# Patient Record
Sex: MALE | Race: WHITE | Employment: STUDENT | URBAN - METROPOLITAN AREA
[De-identification: names, ages, dates, MRNs, and addresses within clinical notes are randomized per-mention and may not be internally consistent; named-entity substitution may affect disease eponyms.]

---

## 2021-04-09 ENCOUNTER — NURSE TRIAGE (OUTPATIENT)
Dept: NURSING | Facility: CLINIC | Age: 21
End: 2021-04-09

## 2021-04-09 ENCOUNTER — OFFICE VISIT (OUTPATIENT)
Dept: FAMILY MEDICINE | Facility: CLINIC | Age: 21
End: 2021-04-09
Payer: COMMERCIAL

## 2021-04-09 ENCOUNTER — ANCILLARY PROCEDURE (OUTPATIENT)
Dept: GENERAL RADIOLOGY | Facility: CLINIC | Age: 21
End: 2021-04-09
Attending: FAMILY MEDICINE
Payer: COMMERCIAL

## 2021-04-09 VITALS
SYSTOLIC BLOOD PRESSURE: 130 MMHG | HEART RATE: 102 BPM | BODY MASS INDEX: 35 KG/M2 | RESPIRATION RATE: 18 BRPM | WEIGHT: 250 LBS | HEIGHT: 71 IN | TEMPERATURE: 98.9 F | OXYGEN SATURATION: 97 % | DIASTOLIC BLOOD PRESSURE: 74 MMHG

## 2021-04-09 DIAGNOSIS — F41.1 GAD (GENERALIZED ANXIETY DISORDER): ICD-10-CM

## 2021-04-09 DIAGNOSIS — R07.89 SENSATION OF CHEST TIGHTNESS: Primary | ICD-10-CM

## 2021-04-09 DIAGNOSIS — F33.2 SEVERE EPISODE OF RECURRENT MAJOR DEPRESSIVE DISORDER, WITHOUT PSYCHOTIC FEATURES (H): ICD-10-CM

## 2021-04-09 PROCEDURE — 99214 OFFICE O/P EST MOD 30 MIN: CPT | Performed by: FAMILY MEDICINE

## 2021-04-09 PROCEDURE — 71046 X-RAY EXAM CHEST 2 VIEWS: CPT | Performed by: RADIOLOGY

## 2021-04-09 PROCEDURE — 96127 BRIEF EMOTIONAL/BEHAV ASSMT: CPT | Mod: 59 | Performed by: FAMILY MEDICINE

## 2021-04-09 PROCEDURE — 93000 ELECTROCARDIOGRAM COMPLETE: CPT | Performed by: FAMILY MEDICINE

## 2021-04-09 RX ORDER — HYDROXYZINE HYDROCHLORIDE 25 MG/1
25-50 TABLET, FILM COATED ORAL 3 TIMES DAILY PRN
Qty: 30 TABLET | Refills: 0 | Status: SHIPPED | OUTPATIENT
Start: 2021-04-09 | End: 2021-05-12

## 2021-04-09 RX ORDER — BUTALBITAL, ACETAMINOPHEN AND CAFFEINE 50; 325; 40 MG/1; MG/1; MG/1
1 CAPSULE ORAL EVERY 4 HOURS PRN
COMMUNITY
End: 2022-04-28

## 2021-04-09 RX ORDER — FLUOXETINE 10 MG/1
10 CAPSULE ORAL DAILY
Qty: 14 CAPSULE | Refills: 0 | Status: SHIPPED | OUTPATIENT
Start: 2021-04-09 | End: 2021-07-03

## 2021-04-09 RX ORDER — OMEGA-3 FATTY ACIDS/FISH OIL 300-1000MG
200 CAPSULE ORAL EVERY 4 HOURS PRN
COMMUNITY
End: 2022-04-28

## 2021-04-09 ASSESSMENT — ANXIETY QUESTIONNAIRES
6. BECOMING EASILY ANNOYED OR IRRITABLE: NEARLY EVERY DAY
GAD7 TOTAL SCORE: 21
7. FEELING AFRAID AS IF SOMETHING AWFUL MIGHT HAPPEN: NEARLY EVERY DAY
3. WORRYING TOO MUCH ABOUT DIFFERENT THINGS: NEARLY EVERY DAY
1. FEELING NERVOUS, ANXIOUS, OR ON EDGE: NEARLY EVERY DAY
5. BEING SO RESTLESS THAT IT IS HARD TO SIT STILL: NEARLY EVERY DAY
2. NOT BEING ABLE TO STOP OR CONTROL WORRYING: NEARLY EVERY DAY
IF YOU CHECKED OFF ANY PROBLEMS ON THIS QUESTIONNAIRE, HOW DIFFICULT HAVE THESE PROBLEMS MADE IT FOR YOU TO DO YOUR WORK, TAKE CARE OF THINGS AT HOME, OR GET ALONG WITH OTHER PEOPLE: EXTREMELY DIFFICULT

## 2021-04-09 ASSESSMENT — PATIENT HEALTH QUESTIONNAIRE - PHQ9
5. POOR APPETITE OR OVEREATING: NEARLY EVERY DAY
SUM OF ALL RESPONSES TO PHQ QUESTIONS 1-9: 21

## 2021-04-09 ASSESSMENT — MIFFLIN-ST. JEOR: SCORE: 2177.47

## 2021-04-09 NOTE — TELEPHONE ENCOUNTER
"Caller reporting that he has same chest discomfort that he was seen for in clinic earlier. Sates that he took one hydroxyzine and isn't better statees he feels he is having panic attack again.   Triage protocol and EMR from [facility] reviewed.   Mother states that she wants  doctor to order  \"blood tests like liver function\" and is  calling assuming clinic still open and PCP still present; Advised clinic closed at 5 pm  Advised to continue medication as prescribed and  call PCP if not improving by Monday   advised to try relaxation techniques an  Home care per protocol   Mother and patient understands and will coply   Kelsy Robins RN  FNA                 Additional Information    Negative: Severe difficulty breathing (e.g., struggling for each breath, speaks in single words)    Negative: Bluish (or gray) lips or face now    Negative: Difficult to awaken or acting confused (e.g., disoriented, slurred speech)    Negative: Hysterical or combative behavior    Negative: Sounds like a life-threatening emergency to the triager    Chest pain    Negative: Severe difficulty breathing (e.g., struggling for each breath, speaks in single words)    Negative: Difficult to awaken or acting confused (e.g., disoriented, slurred speech)    Negative: Shock suspected (e.g., cold/pale/clammy skin, too weak to stand, low BP, rapid pulse)    Negative: [1] Chest pain lasts > 5 minutes AND [2] history of heart disease  (i.e., heart attack, bypass surgery, angina, angioplasty, CHF; not just a heart murmur)    Negative: [1] Chest pain lasts > 5 minutes AND [2] described as crushing, pressure-like, or heavy    Negative: [1] Chest pain lasts > 5 minutes AND [2] age > 50    Negative: [1] Chest pain lasts > 5 minutes AND [2] age > 30 AND [3] at least one cardiac risk factor (i.e., hypertension, diabetes, obesity, smoker or strong family history of heart disease)    Negative: [1] Chest pain lasts > 5 minutes AND [2] not relieved with nitroglycerin    " "Negative: Passed out (i.e., lost consciousness, collapsed and was not responding)    Negative: Heart beating < 50 beats per minute OR > 140 beats per minute    Negative: Visible sweat on face or sweat dripping down face    Negative: Sounds like a life-threatening emergency to the triager    Negative: Followed a chest injury    Negative: SEVERE chest pain    Negative: [1] Intermittent  chest pain or \"angina\" AND [2] increasing in severity or frequency  (Exception: pains lasting a few seconds)    Negative: Pain also present in shoulder(s) or arm(s) or jaw  (Exception: pain is clearly made worse by movement)    Negative: Difficulty breathing    Negative: Dizziness or lightheadedness    Negative: Coughing up blood    Negative: Cocaine use within last 3 days    Negative: History of prior \"blood clot\" in leg or lungs (i.e., deep vein thrombosis, pulmonary embolism)    Negative: Recent illness requiring prolonged bedrest (i.e., immobilization)    Negative: Hip or leg fracture in past 2 months (e.g., had cast on leg or ankle)    Negative: Major surgery in the past month    Negative: Recent long-distance travel with prolonged time in car, bus, plane, or train (i.e., within past 2 weeks; 6 or  more hours duration)    Negative: Chest pain lasts > 5 minutes (Exceptions: chest pain occurring > 3 days ago and now asymptomatic; same as previously diagnosed heartburn and has accompanying sour taste in mouth)    Negative: Taking a deep breath makes pain worse    Negative: Patient sounds very sick or weak to the triager    Negative: [1] Chest pain lasts > 5 minutes AND [2] occurred > 3 days ago (72 hours) AND [3] NO chest pain or cardiac symptoms now    Negative: [1] Chest pain lasting <= 5 minutes AND [2] NO chest pain or cardiac symptoms now(Exceptions: pains lasting a few seconds)    Negative: Fever > 100.5 F (38.1 C)    Negative: Rash in same area as pain (may be described as \"small blisters\")    Negative: [1] Patient claims " "chest pain is same as previously diagnosed \"heartburn\" AND [2] describes burning in chest AND [3] accompanying sour taste in mouth    Negative: [1] Chest pain lasting <= 5 minutes AND [2] has not taken prescribed nitroglycerin    Negative: [1] Chest pain lasting <= 5 minutes AND [2] completely relieved by nitroglycerin    Negative: [1] Intermittent chest pain from \"angina\" AND [2] NO increase in severity or frequency    Negative: Chest pain(s) lasting a few seconds from coughing AND [2] persists > 3 days    Negative: [1] Chest pain(s) lasting a few seconds AND [2] persists > 3 days    Negative: Chest pain(s) lasting a few seconds from coughing    Negative: Chest pain(s) lasting a few seconds    Negative: [1] Difficulty breathing AND [2] persists > 10 minutes AND [3] not relieved by reassurance provided by triager    Negative: [1] Lightheadedness or dizziness AND [2] persists > 10 minutes AND [3] not relieved by reassurance provided by triager    Negative: [1] Alcohol or drug abuse, known or suspected AND [2] feeling very shaky (i.e., visible tremors of hands)    Negative: Patient sounds very sick or weak to the triager    Negative: Symptoms interfere with work or school    Negative: Requesting to talk to a counselor (e.g., mental health worker, psychiatrist)    Negative: Patient sounds very upset or troubled to the triager    [1] Started on anti-anxiety medication AND [2] no relief    Commented on: All Negative - Home Care     Same  sternalpain seen today in clinicfor  And after work up  Dx anxiety    Protocols used: ANXIETY AND PANIC ATTACK-A-AH, CHEST PAIN-A-AH      "

## 2021-04-09 NOTE — PROGRESS NOTES
1. Sensation of chest tightness  - d/d ACS vs PE vs pneumonia vs aortic dissection vs panic attack vs costochondritis vs GERD vs other etiology  - EKG was ordered which showed no acute changes  - CXR per my read showed no acute changes  - Discussed symptoms could be due to panic attacks and recommended deep breathing.   - EKG 12-lead complete w/read - Clinics  - XR Chest 2 Views    2. Severe episode of recurrent major depressive disorder, without psychotic features (H)  PHQ 4/9/2021   PHQ-9 Total Score 21   Q9: Thoughts of better off dead/self-harm past 2 weeks More than half the days     - Severe depression and with anxiety and panic attacks. Passive SI, no active thoughts. Mom is supportive and if thoughts were to get worse then pt would be seen in ER.   - Discussed different modalities to treat depression including exercise, journaling, partaking in interest, therapy and antidepressants. Explained different options for antidepressants including most common side effects.  - Discussed that Prozac usually takes few weeks for clinical effect. Discussed side effects of medication.   - Recommended to use Atarax PRN to help with anxiety and panic attacks. Advised medication causes drowsiness and to not drive or take with alcohol.   - FLUoxetine (PROZAC) 10 MG capsule; Take 1 capsule (10 mg) by mouth daily for 14 days  Dispense: 14 capsule; Refill: 0  - FLUoxetine (PROZAC) 20 MG capsule; Take 1 capsule (20 mg) by mouth daily  Dispense: 30 capsule; Refill: 0  - hydrOXYzine (ATARAX) 25 MG tablet; Take 1-2 tablets (25-50 mg) by mouth 3 times daily as needed for anxiety  Dispense: 30 tablet; Refill: 0  - MENTAL HEALTH REFERRAL  - Adult; Outpatient Treatment; Individual/Couples/Family/Group Therapy/Health Psychology; [facility]: [facility] [phone]; We will contact you to schedule the appointment or please call with any questions    3. MAXIMUS (generalized anxiety disorder)  - see above  - FLUoxetine (PROZAC) 10  MG capsule; Take 1 capsule (10 mg) by mouth daily for 14 days  Dispense: 14 capsule; Refill: 0  - FLUoxetine (PROZAC) 20 MG capsule; Take 1 capsule (20 mg) by mouth daily  Dispense: 30 capsule; Refill: 0  - hydrOXYzine (ATARAX) 25 MG tablet; Take 1-2 tablets (25-50 mg) by mouth 3 times daily as needed for anxiety  Dispense: 30 tablet; Refill: 0  - MENTAL HEALTH REFERRAL  - Adult; Outpatient Treatment; Individual/Couples/Family/Group Therapy/Health Psychology; [place]: [facility] [phone]; We will contact you to schedule the appointment or please call with any questions    Subjective   Vidal is a 20 year old who presents for the following health issues     HPI     The last two night he has been experiencing panic attacks. He was shaking, hyperventilating and crying. They called 911 and yesterday he was evaluated by EMS. Normal vitals. When he breaths now he feels tension in his chest. H/o MDD and MAXIMUS - he notes that symptoms have gotten worse over the last few weeks. Stressors - drama with some friends. Recent move and it is a completely new environment.  He is worried about getting a job. During the panic attacks he is thinking about good things and tries to distract himself. That usually works. When he tried to sleep today, it was difficult due to tension in chest. Last night sleep was disrupted due to panic attacks. He has thoughts of being better off dead, he has symptoms of worthlessness. Couple of weeks ago he was really down. He started thinking of a way out. He decided that he wasn't going to do it. He told his Mom. No weapons at home. No previous suicide attempts. No previous medications. He would be interested in therapy. No delusions or hallucinations. No bipolar symptoms. H/o ADHD - no medications. He graduated one year ago. No cough.     Review of Systems         Objective    There were no vitals taken for this visit.  There is no height or weight on file to calculate BMI.  Physical  "Exam   /74 (BP Location: Right arm, Patient Position: Chair, Cuff Size: Adult Large)   Pulse 102   Temp 98.9  F (37.2  C) (Tympanic)   Resp 18   Ht 1.814 m (5' 11.4\")   Wt 113.4 kg (250 lb)   SpO2 97%   BMI 34.48 kg/m    GENERAL: healthy, alert and no distress  RESP: lungs clear to auscultation - no rales, rhonchi or wheezes  CV: regular rate and rhythm, normal S1 S2  PSYCH: mood depressed, affect congruent                  "

## 2021-04-09 NOTE — PATIENT INSTRUCTIONS
- FLUoxetine (PROZAC) 10 MG capsule; Take 1 capsule (10 mg) by mouth daily for 14 days   - FLUoxetine (PROZAC) 20 MG capsule; Take 1 capsule (20 mg) by mouth daily    - hydrOXYzine (ATARAX) 25 MG tablet; Take 1-2 tablets (25-50 mg) by mouth 3 times daily as needed for anxiety ; do not drive or take with alcohol

## 2021-04-09 NOTE — LETTER
April 13, 2021      Vidal Allardice Fuad  [address] [place] [facility] [address]        Dear ,    We are writing to inform you of your test results.      Your chest xray was normal. Please call or message me if you have questions or concerns.     Resulted Orders   XR Chest 2 Views    Narrative    CHEST TWO VIEWS 4/9/2021 2:47 PM     HISTORY: Sensation of chest tightness.    COMPARISON: None.       Impression    IMPRESSION:  There are no acute infiltrates. The cardiac silhouette is  not enlarged. Pulmonary vasculature is unremarkable.     PAVAN MICHAEL MD     Sincerely,      Luis F Arce MD

## 2021-04-10 ASSESSMENT — ANXIETY QUESTIONNAIRES: GAD7 TOTAL SCORE: 21

## 2021-04-13 NOTE — RESULT ENCOUNTER NOTE
Please send the letter to the patient with the following.         Your chest xray was normal. Please call or message me if you have questions or concerns.

## 2021-05-06 ENCOUNTER — OFFICE VISIT (OUTPATIENT)
Dept: URGENT CARE | Facility: URGENT CARE | Age: 21
End: 2021-05-06
Payer: COMMERCIAL

## 2021-05-06 ENCOUNTER — NURSE TRIAGE (OUTPATIENT)
Dept: NURSING | Facility: CLINIC | Age: 21
End: 2021-05-06

## 2021-05-06 VITALS
HEART RATE: 97 BPM | DIASTOLIC BLOOD PRESSURE: 80 MMHG | WEIGHT: 250 LBS | SYSTOLIC BLOOD PRESSURE: 132 MMHG | OXYGEN SATURATION: 97 % | TEMPERATURE: 98.1 F | BODY MASS INDEX: 34.48 KG/M2

## 2021-05-06 DIAGNOSIS — H93.13 TINNITUS, BILATERAL: Primary | ICD-10-CM

## 2021-05-06 PROCEDURE — 99213 OFFICE O/P EST LOW 20 MIN: CPT | Performed by: FAMILY MEDICINE

## 2021-05-06 NOTE — PATIENT INSTRUCTIONS
Discontinue use of aspirin      For pain use the tylenol and/or ibuprofen instead of aspirin      Continue the fluoxetine at this time  -- if no improvement in symptoms please reach out to your doctor's office to discuss a plan

## 2021-05-06 NOTE — TELEPHONE ENCOUNTER
Spoke w/ mother after obtaining verbal consent from pt.     C/o tinnitus in both ears. L ear worse than R. Denies ear injury or loud noise. Does stick Q-tips in his ears. Denies hearing loss or ear pain. Took ASA on 5/1 x 1 dose but otherwise no ASA use. Takes fluoxetine and hydroxizine. Says tinnitus interfering w/ his usual activities. Advised provider eval w/i 3 days.     Additional Information    Negative: Hearing loss is main symptom    Negative: Followed an ear injury    Negative: Patient sounds very sick or weak to the triager    Negative: [1] Hearing loss in one or both ears AND [2] sudden onset AND [3] present now    Negative: Ear is painful    Negative: Decreased hearing followed sudden, extremely loud noise (e.g., explosion, not just loud concert)    Negative: Taking medication that can damage hearing (i.e., gentamycin, tobramycin, furosemide, ethacrynic acid, cisplatin, quinidine)    Negative: Long-term (current) use of aspirin    MODERATE-SEVERE tinnitus (i.e., interferes with work, school, or sleep)    Protocols used: TINNITUS-A-AH

## 2021-05-06 NOTE — PROGRESS NOTES
Assessment & Plan     Tinnitus, bilateral       Patient did take aspirin for some rib discomfort the other day there is a small but possible chance this may have triggered things. Incidence of tinnitus with prozac use is ~1% -- considered risks/benefits. Discussed that tinnitus is not uncommon and in most individuals resolves within 1-2 weeks. Should symptoms worsen or persist recommend referral to ENT specialist.     No evidence of infection on exam    Emiliano Oneal MD   [place] [facility]    Rip Drake is a 20 year old male who presents to clinic today for the following health issues:  Chief Complaint   Patient presents with     Urgent Care     Ear Problem     c/o ringing on ears for 1 day     HPI    He denies ear pain. The ear ringing first started 2 days ago -- this appears to be constant.   He denies dizziness/fevers.   Has had new medications in the last month started on fluoxetine along with PRN hydroxyzine.     Denies hearing loss  No recent travel  Or diving.     No runny nose/congestion.   They are in the process of getting him in to see therapy.   There has been a noticeable improvement in his panic attacks while being on fluoxetine (little benefit with the atarax)     Remedies attempted: none    Accompanied by mom  Patient Active Problem List    Diagnosis Date Noted     Autism spectrum disorder 04/05/2016     Priority: Medium     Anxiety 10/29/2012     Priority: Medium     Wart 04/04/2012     Priority: Medium     Pervasive developmental disorder 10/05/2011     Priority: Medium     Cellulitis 10/05/2011     Priority: Medium     ADHD (attention deficit hyperactivity disorder) 10/15/2010     Priority: Medium     FAMILY HX CONDITION NEC cystic fibrosis in half br 03/20/2007     Priority: Medium     Father and step mother carriers of cystic fibrosis       Constipation 03/20/2007     Priority: Medium     Problem list name updated by automated process. Provider to review       eczema  04/07/2004     Priority: Medium       Current Outpatient Medications   Medication     FLUoxetine (PROZAC) 20 MG capsule     hydrOXYzine (ATARAX) 25 MG tablet     butalbital-acetaminophen-caffeine (ESGIC) -40 MG CAPS per capsule     FLUoxetine (PROZAC) 10 MG capsule     ibuprofen (ADVIL/MOTRIN) 200 MG capsule     No current facility-administered medications for this visit.            Objective    /80   Pulse 97   Temp 98.1  F (36.7  C) (Tympanic)   Wt 113.4 kg (250 lb)   SpO2 97%   BMI 34.48 kg/m    Physical Exam     Ears: normal TMs bilaterally    No results found for any visits on 05/06/21.                  The use of Dragon/[employer] dictation services may have been used to construct the content in this note; any grammatical or spelling errors are non-intentional. Please contact the author of this note directly if you are in need of any clarification.

## 2021-05-12 ENCOUNTER — OFFICE VISIT (OUTPATIENT)
Dept: FAMILY MEDICINE | Facility: CLINIC | Age: 21
End: 2021-05-12
Payer: COMMERCIAL

## 2021-05-12 VITALS
DIASTOLIC BLOOD PRESSURE: 79 MMHG | TEMPERATURE: 97.8 F | WEIGHT: 250 LBS | HEART RATE: 88 BPM | OXYGEN SATURATION: 94 % | BODY MASS INDEX: 34.48 KG/M2 | SYSTOLIC BLOOD PRESSURE: 144 MMHG

## 2021-05-12 DIAGNOSIS — F33.2 SEVERE EPISODE OF RECURRENT MAJOR DEPRESSIVE DISORDER, WITHOUT PSYCHOTIC FEATURES (H): ICD-10-CM

## 2021-05-12 DIAGNOSIS — F41.1 GAD (GENERALIZED ANXIETY DISORDER): ICD-10-CM

## 2021-05-12 PROCEDURE — 99214 OFFICE O/P EST MOD 30 MIN: CPT | Performed by: STUDENT IN AN ORGANIZED HEALTH CARE EDUCATION/TRAINING PROGRAM

## 2021-05-12 RX ORDER — HYDROXYZINE HYDROCHLORIDE 25 MG/1
25-50 TABLET, FILM COATED ORAL 3 TIMES DAILY PRN
Qty: 60 TABLET | Refills: 1 | Status: SHIPPED | OUTPATIENT
Start: 2021-05-12 | End: 2022-04-28

## 2021-05-12 ASSESSMENT — ANXIETY QUESTIONNAIRES
3. WORRYING TOO MUCH ABOUT DIFFERENT THINGS: NEARLY EVERY DAY
1. FEELING NERVOUS, ANXIOUS, OR ON EDGE: MORE THAN HALF THE DAYS
GAD7 TOTAL SCORE: 14
2. NOT BEING ABLE TO STOP OR CONTROL WORRYING: NEARLY EVERY DAY
GAD7 TOTAL SCORE: 14
5. BEING SO RESTLESS THAT IT IS HARD TO SIT STILL: SEVERAL DAYS
7. FEELING AFRAID AS IF SOMETHING AWFUL MIGHT HAPPEN: MORE THAN HALF THE DAYS
7. FEELING AFRAID AS IF SOMETHING AWFUL MIGHT HAPPEN: MORE THAN HALF THE DAYS
4. TROUBLE RELAXING: MORE THAN HALF THE DAYS
GAD7 TOTAL SCORE: 14
6. BECOMING EASILY ANNOYED OR IRRITABLE: SEVERAL DAYS

## 2021-05-12 ASSESSMENT — PATIENT HEALTH QUESTIONNAIRE - PHQ9
10. IF YOU CHECKED OFF ANY PROBLEMS, HOW DIFFICULT HAVE THESE PROBLEMS MADE IT FOR YOU TO DO YOUR WORK, TAKE CARE OF THINGS AT HOME, OR GET ALONG WITH OTHER PEOPLE: SOMEWHAT DIFFICULT
SUM OF ALL RESPONSES TO PHQ QUESTIONS 1-9: 17
SUM OF ALL RESPONSES TO PHQ QUESTIONS 1-9: 17

## 2021-05-12 NOTE — LETTER
My Depression Action Plan  Name: Vidal Merida   Date of Birth 2000  Date: 5/12/2021    My doctor: Herman Bustos   My clinic: [facility] [address] [place] [address]  [phone]          GREEN    ZONE   Good Control    What it looks like:     Things are going generally well. You have normal ups and downs. You may even feel depressed from time to time, but bad moods usually last less than a day.   What you need to do:  1. Continue to care for yourself (see self care plan)  2. Check your depression survival kit and update it as needed  3. Follow your physician s recommendations including any medication.  4. Do not stop taking medication unless you consult with your physician first.           YELLOW         ZONE Getting Worse    What it looks like:     Depression is starting to interfere with your life.     It may be hard to get out of bed; you may be starting to isolate yourself from others.    Symptoms of depression are starting to last most all day and this has happened for several days.     You may have suicidal thoughts but they are not constant.   What you need to do:     1. Call your care team. Your response to treatment will improve if you keep your care team informed of your progress. Yellow periods are signs an adjustment may need to be made.     2. Continue your self-care.  Just get dressed and ready for the day.  Don't give yourself time to talk yourself out of it.    3. Talk to someone in your support network.    4. Open up your Depression Self-Care Plan/Wellness Kit.           RED    ZONE Medical Alert - Get Help    What it looks like:     Depression is seriously interfering with your life.     You may experience these or other symptoms: You can t get out of bed most days, can t work or engage in other necessary activities, you have trouble taking care of basic hygiene, or basic responsibilities, thoughts of suicide or death that  will not go away, self-injurious behavior.     What you need to do:  1. Call your care team and request a same-day appointment. If they are not available (weekends or after hours) call your local crisis line, emergency room or 911.          Depression Self-Care Plan / Wellness Kit    Many people find that medication and therapy are helpful treatments for managing depression. In addition, making small changes to your everyday life can help to boost your mood and improve your wellbeing. Below are some tips for you to consider. Be sure to talk with your medical provider and/or behavioral health consultant if your symptoms are worsening or not improving.     Sleep   Sleep hygiene  means all of the habits that support good, restful sleep. It includes maintaining a consistent bedtime and wake time, using your bedroom only for sleeping or sex, and keeping the bedroom dark and free of distractions like a computer, smartphone, or television.     Develop a Healthy Routine  Maintain good hygiene. Get out of bed in the morning, make your bed, brush your teeth, take a shower, and get dressed. Don t spend too much time viewing media that makes you feel stressed. Find time to relax each day.    Exercise  Get some form of exercise every day. This will help reduce pain and release endorphins, the  feel good  chemicals in your brain. It can be as simple as just going for a walk or doing some gardening, anything that will get you moving.      Diet  Strive to eat healthy foods, including fruits and vegetables. Drink plenty of water. Avoid excessive sugar, caffeine, alcohol, and other mood-altering substances.     Stay Connected with Others  Stay in touch with friends and family members.    Manage Your Mood  Try deep breathing, massage therapy, biofeedback, or meditation. Take part in fun activities when you can. Try to find something to smile about each day.     Psychotherapy  Be open to working with a therapist if your provider  recommends it.     Medication  Be sure to take your medication as prescribed. Most anti-depressants need to be taken every day. It usually takes several weeks for medications to work. Not all medicines work for all people. It is important to follow-up with your provider to make sure you have a treatment plan that is working for you. Do not stop your medication abruptly without first discussing it with your provider.    Crisis Resources   These hotlines are for both adults and children. They and are open 24 hours a day, 7 days a week unless noted otherwise.      National Suicide Prevention Lifeline   0-618-883-ZBAX (3160)      Crisis Text Line    www.crisistextline.org  Text HOME to 547581 from anywhere in the [place], anytime, about any type of crisis. A live, trained crisis counselor will receive the text and respond quickly.      [employer] Lifeline for LGBTQ Youth  A national crisis intervention and suicide lifeline for LGBTQ youth under 25. Provides a safe place to talk without judgement. Call 1-747.150.7617; text START to 036191 or visit www.thetrevorproject.org to talk to a trained counselor.      For [facility] crisis numbers, visit the [facility] website at:  https://[place].gov/dhs/people-we-serve/adults/health-care/mental-health/resources/crisis-contacts.jsp

## 2021-05-12 NOTE — PROGRESS NOTES
Assessment & Plan     Severe episode of recurrent major depressive disorder, without psychotic features (H)  Side effect of tinnitus with Prozac is about 1%. I am not convinced this was a consequence of taking the medication. He does have history of seasonal allergies and we discussed this may have been a possible trigger. Tinnitus has nearly resolved per patient. Discussed options to restart Prozac and see whether symptoms recur or try an alternative selective serotonin reuptake inhibitor. He would like to do the former and will call if he has recurrence of symptoms. Otherwise follow up in 1 month for recheck. He DOES contract for safety today based on lack of suicidal thoughts or plan and reported improvement in symptoms, ability to clearly state a safety plan. Depression action plan provided today. He will call therapy referral line.   - hydrOXYzine (ATARAX) 25 MG tablet  Dispense: 60 tablet; Refill: 1  - FLUoxetine (PROZAC) 20 MG capsule  Dispense: 30 capsule; Refill: 0    MAXIMUS (generalized anxiety disorder)  - hydrOXYzine (ATARAX) 25 MG tablet  Dispense: 60 tablet; Refill: 1  - FLUoxetine (PROZAC) 20 MG capsule  Dispense: 30 capsule; Refill: 0    Depression Screening Follow Up    PHQ 5/12/2021   PHQ-9 Total Score 17   Q9: Thoughts of better off dead/self-harm past 2 weeks More than half the days   F/U: Thoughts of suicide or self-harm Yes   F/U: Self harm-plan No   F/U: Self-harm action No   F/U: Safety concerns No       Natalie Hartley MD  [facility]    Rip Drake is a 20 year old who presents for the following health issues  accompanied by his mother:    HPI     Seen 4/9 for panic attacks and worsened anxiety. History of MAXIMUS, MDD. Has had suicidal thoughts in the past but sought help and has not had any previous attempt. He was started on Prozac and Atarax to take as needed. Also referred to therapy.     Updates:  Things had seemed to be going better on Prozac. He had  started on the 20 mg dose, however just around this time he developed tinnitus. Seen for this in  on 5/6. He stopped the Prozac at this time concerned that it was a side effect. Has since noted that the tinnitus has improved mostly. Hasn't had any panic attacks since his visit in early April and would like to get back on medication.  He is not seeing a therapist yet. Does have phone contact info to call. He is not currently working. Living with mom. Enjoys playing video games. Not doing a lot otherwise. He has been feeling less sad since starting the medication. Sleep has been OK. No longer having issues with friends (had noted this as a pervious stressor). He is currently trying to get his license, trying to get a job. Tends to over think things. Denies loss of motivation. Hasn't had any issues with focus. Had been perseverating about the tinnitus but otherwise no other obsessions. He denies any thoughts of self harm or SI. Had been having passing suicidal thoughts at his last visit but these have resolved and not returned. Has never made an attempt at self harm or suicide in past. States he would seek out help by calling a crisis line or talking to his mom if those thoughts were to come up. He was taking hydroxyzine 1-2 tabs as needed until he ran out last week.     Issues with depression and anxiety started since COVID pandemic and moving to [place] in October after his partents' divorce. Grandfather started chemotherapy. Some big stressors.     Did have a couple headaches with left ear pain.  No sinus or facial pressure. Has had issues with seasonal allergies as a child but they got better with age. No hearing loss, ear fullness, ear pain, drainage, vertigo.       MAXIMUS-7 SCORE 4/9/2021 5/12/2021   Total Score - 14 (moderate anxiety)   Total Score 21 14       PHQ 4/9/2021 5/12/2021   PHQ-9 Total Score 21 17   Q9: Thoughts of better off dead/self-harm past 2 weeks More than half the days More than half the days    F/U: Thoughts of suicide or self-harm - Yes   F/U: Self harm-plan - No   F/U: Self-harm action - No   F/U: Safety concerns - No       Social History     Tobacco Use     Smoking status: Never Smoker     Smokeless tobacco: Never Used     Tobacco comment: nonsmoking household   Substance Use Topics     Alcohol use: No     Drug use: No     MAXIMUS-7 SCORE 4/9/2021 5/12/2021   Total Score - 14 (moderate anxiety)   Total Score 21 14     PHQ 4/9/2021 5/12/2021   PHQ-9 Total Score 21 17   Q9: Thoughts of better off dead/self-harm past 2 weeks More than half the days More than half the days   F/U: Thoughts of suicide or self-harm - Yes   F/U: Self harm-plan - No   F/U: Self-harm action - No   F/U: Safety concerns - No         Objective    BP (!) 144/79   Pulse 88   Temp 97.8  F (36.6  C) (Tympanic)   Wt 113.4 kg (250 lb)   SpO2 94%   BMI 34.48 kg/m    Body mass index is 34.48 kg/m .  Physical Exam   GENERAL: healthy, alert and no distress  EYES: Eyes grossly normal to inspection, PERRL and conjunctivae and sclerae normal  HENT: ear canals and TM's normal, nose and mouth without ulcers or lesions  LUNGS: respirations unlabored  MS: no gross musculoskeletal defects noted, no edema  PSYCH: euthymic, normal affect, no SI/HI    Answers for HPI/ROS submitted by the patient on 5/12/2021   If you checked off any problems, how difficult have these problems made it for you to do your work, take care of things at home, or get along with other people?: Somewhat difficult  PHQ9 TOTAL SCORE: 17  MAXIMUS 7 TOTAL SCORE: 14

## 2021-05-12 NOTE — PATIENT INSTRUCTIONS
Restart prozac.    If ringing in ears returns, please call us and schedule and appointment.     Follow up for recheck of mood in 1 month with myself or Dr. Arce.    Call to schedule your visit with therapy.

## 2021-05-13 ASSESSMENT — PATIENT HEALTH QUESTIONNAIRE - PHQ9: SUM OF ALL RESPONSES TO PHQ QUESTIONS 1-9: 17

## 2021-05-13 ASSESSMENT — ANXIETY QUESTIONNAIRES: GAD7 TOTAL SCORE: 14

## 2021-05-30 ENCOUNTER — OFFICE VISIT (OUTPATIENT)
Dept: URGENT CARE | Facility: URGENT CARE | Age: 21
End: 2021-05-30
Payer: COMMERCIAL

## 2021-05-30 VITALS
BODY MASS INDEX: 33.86 KG/M2 | TEMPERATURE: 98 F | RESPIRATION RATE: 16 BRPM | HEART RATE: 80 BPM | HEIGHT: 72 IN | SYSTOLIC BLOOD PRESSURE: 120 MMHG | DIASTOLIC BLOOD PRESSURE: 82 MMHG | WEIGHT: 250 LBS

## 2021-05-30 DIAGNOSIS — H93.13 TINNITUS, BILATERAL: Primary | ICD-10-CM

## 2021-05-30 PROCEDURE — 99213 OFFICE O/P EST LOW 20 MIN: CPT | Performed by: FAMILY MEDICINE

## 2021-05-30 ASSESSMENT — MIFFLIN-ST. JEOR: SCORE: 2179.05

## 2021-05-30 NOTE — PROGRESS NOTES
"SUBJECTIVE:   Vidal Merida is a 20 year old male presenting with   Chief Complaint   Patient presents with     Urgent Care     Tinnitus     ringing in both ears x 3 weeks, worse in  left ear. Also some headaches.      He first noticed ringing in his ears about 3 weeks ago, described as a high pitched sound that waxes and wanes.  He was seen in  on 5/6, note reviewed.   He did take 1 aspirin, but thinks that may have been after the ringing started, and also recalls increasing his prozac from 10mg to 20mg a few days prior to onset of the ringing starting.  He quit taking the prozac when the ringing started, in case this was causing his symptoms.  He does use earbuds and headphones.   He is here today with his Mother as the ringing has not subsided.  He uses white noise at night to help distract.   He has had some post nasal drainage, but not really feeling stuffy or congested.  No ear pain.     OBJECTIVE  /82   Pulse 80   Temp 98  F (36.7  C) (Tympanic)   Resp 16   Ht 1.816 m (5' 11.5\")   Wt 113.4 kg (250 lb)   BMI 34.38 kg/m    GENERAL:  Awake, alert and interactive. No acute distress.  HEENT:   NC/AT, EOMI, clear conjunctiva.  Clear nasal discharge.  TM's and EAC's benign.      ASSESSMENT/PLAN    ICD-10-CM    1. Tinnitus, bilateral  H93.13 OTOLARYNGOLOGY REFERRAL     Advised no headphones or earbuds at all until he has his hearing evaluation.   Keep volumes down on tv and electronics.  Continue with white noise as needed at night time.   F/u with ENT, referral placed today, and f/u with PCP if any worsening symptoms prior to ENT appointment.                          "

## 2021-07-03 ENCOUNTER — OFFICE VISIT (OUTPATIENT)
Dept: URGENT CARE | Facility: URGENT CARE | Age: 21
End: 2021-07-03
Payer: COMMERCIAL

## 2021-07-03 VITALS
BODY MASS INDEX: 34.38 KG/M2 | TEMPERATURE: 98.1 F | WEIGHT: 250 LBS | DIASTOLIC BLOOD PRESSURE: 80 MMHG | HEART RATE: 75 BPM | OXYGEN SATURATION: 97 % | SYSTOLIC BLOOD PRESSURE: 129 MMHG

## 2021-07-03 DIAGNOSIS — G89.18 POSTOPERATIVE PAIN: Primary | ICD-10-CM

## 2021-07-03 LAB
BASOPHILS # BLD AUTO: 0 10E9/L (ref 0–0.2)
BASOPHILS NFR BLD AUTO: 0.3 %
DIFFERENTIAL METHOD BLD: NORMAL
EOSINOPHIL # BLD AUTO: 0.3 10E9/L (ref 0–0.7)
EOSINOPHIL NFR BLD AUTO: 3.7 %
ERYTHROCYTE [DISTWIDTH] IN BLOOD BY AUTOMATED COUNT: 12.7 % (ref 10–15)
HCT VFR BLD AUTO: 45.5 % (ref 40–53)
HGB BLD-MCNC: 16 G/DL (ref 13.3–17.7)
LYMPHOCYTES # BLD AUTO: 2.6 10E9/L (ref 0.8–5.3)
LYMPHOCYTES NFR BLD AUTO: 33.4 %
MCH RBC QN AUTO: 29.6 PG (ref 26.5–33)
MCHC RBC AUTO-ENTMCNC: 35.2 G/DL (ref 31.5–36.5)
MCV RBC AUTO: 84 FL (ref 78–100)
MONOCYTES # BLD AUTO: 0.6 10E9/L (ref 0–1.3)
MONOCYTES NFR BLD AUTO: 7.8 %
NEUTROPHILS # BLD AUTO: 4.3 10E9/L (ref 1.6–8.3)
NEUTROPHILS NFR BLD AUTO: 54.8 %
PLATELET # BLD AUTO: 383 10E9/L (ref 150–450)
RBC # BLD AUTO: 5.41 10E12/L (ref 4.4–5.9)
WBC # BLD AUTO: 7.9 10E9/L (ref 4–11)

## 2021-07-03 PROCEDURE — 36415 COLL VENOUS BLD VENIPUNCTURE: CPT | Performed by: PHYSICIAN ASSISTANT

## 2021-07-03 PROCEDURE — 99214 OFFICE O/P EST MOD 30 MIN: CPT | Performed by: PHYSICIAN ASSISTANT

## 2021-07-03 PROCEDURE — 85025 COMPLETE CBC W/AUTO DIFF WBC: CPT | Performed by: PHYSICIAN ASSISTANT

## 2021-07-03 RX ORDER — HYDROCODONE BITARTRATE AND ACETAMINOPHEN 5; 325 MG/1; MG/1
1 TABLET ORAL EVERY 6 HOURS PRN
Qty: 5 TABLET | Refills: 0 | Status: SHIPPED | OUTPATIENT
Start: 2021-07-03 | End: 2021-07-06

## 2021-07-03 NOTE — PATIENT INSTRUCTIONS
Continue with ibuprofen for pain. Along with ice to your cheek.   I gave you several more tablets of Norco to help with severe pain  If you develop fever, chills, difficulty opening your mouth, difficulty swallowing or increased facial swelling, you need to be seen right away

## 2021-07-03 NOTE — PROGRESS NOTES
Assessment & Plan     1. Postoperative pain  Following wisdom tooth extraction.  He has slight swelling of the gumline proximal to where the extraction took place.  Possibly early infection, will treat with Augmentin.  No evidence of deep neck space infection or orofacial infection, he does not have fever, chills, trismus, drooling, difficulty swallowing or increased facial swelling.  We discussed red flag symptoms for follow-up in the emergency department.  Blackbox warning discussed with narcotics.  Pain early next week, call surgeon who performed wisdom tooth extraction for follow-up.  - CBC with platelets and differential      Return in about 1 day (around 7/4/2021), or if symptoms worsen or fail to improve.    Diagnosis and treatment plan was reviewed with patient and/or family.   We went over any labs or imaging. Discussed worsening symptoms or little to no relief despite treatment plan to follow-up in ER.  Patient verbalizes understanding. All questions were addressed and answered.     Leann Smith PA-C  [facility] [place]    CHIEF COMPLAINT:   Chief Complaint   Patient presents with     Urgent Care     Jaw Pain     c/o jaw pain for 2 days     Subjective     Vidal is a 20 year old male who presents to clinic today for evaluation of jaw pain. Patient reports that he had wisdom teeth removed on 6/30/2021.  He had pain shortly after surgery, and has been taking hydrocodone for his symptoms.  Yesterday was his last dose of hydrocodone.  Now, complaining of increased pain on the left lower side of his jaw.  Goes from site of wisdom tooth extraction and moves medially.  He has not had fever, chills, chest pain, shortness of breath, trismus, drooling, difficulty swallowing or weakness.      No past medical history on file.  No past surgical history on file.  Social History     Tobacco Use     Smoking status: Never Smoker     Smokeless tobacco: Never Used     Tobacco comment:  nonsmoking household   Substance Use Topics     Alcohol use: No     Current Outpatient Medications   Medication     butalbital-acetaminophen-caffeine (ESGIC) -40 MG CAPS per capsule     FLUoxetine (PROZAC) 20 MG capsule     hydrOXYzine (ATARAX) 25 MG tablet     ibuprofen (ADVIL/MOTRIN) 200 MG capsule     No current facility-administered medications for this visit.      Allergies   Allergen Reactions     No Known Allergies        10 point ROS of systems were all negative except for pertinent positives noted in my HPI.      Exam:   /80   Pulse 75   Temp 98.1  F (36.7  C) (Tympanic)   Wt 113.4 kg (250 lb)   SpO2 97%   BMI 34.38 kg/m    Constitutional: healthy, alert and no distress  Head: Normocephalic, atraumatic.  Eyes: conjunctiva clear, no drainage  ENT:  throat without tonsillar hypertrophy or erythema. Site of wisdom tooth extraction noted. He does not have trismus or drooling. Slight inflammation noted at gum line. No fluctuance noted.   Neck: neck is supple, no cervical lymphadenopathy or nuchal rigidity  Cardiovascular: RRR  Respiratory: CTA bilaterally, no rhonchi or rales  Neurologic: Speech clear, gait normal. Moves all extremities.      Results for orders placed or performed in visit on 07/03/21   CBC with platelets and differential     Status: None   Result Value Ref Range    WBC 7.9 4.0 - 11.0 10e9/L    RBC Count 5.41 4.4 - 5.9 10e12/L    Hemoglobin 16.0 13.3 - 17.7 g/dL    Hematocrit 45.5 40.0 - 53.0 %    MCV 84 78 - 100 fl    MCH 29.6 26.5 - 33.0 pg    MCHC 35.2 31.5 - 36.5 g/dL    RDW 12.7 10.0 - 15.0 %    Platelet Count 383 150 - 450 10e9/L    % Neutrophils 54.8 %    % Lymphocytes 33.4 %    % Monocytes 7.8 %    % Eosinophils 3.7 %    % Basophils 0.3 %    Absolute Neutrophil 4.3 1.6 - 8.3 10e9/L    Absolute Lymphocytes 2.6 0.8 - 5.3 10e9/L    Absolute Monocytes 0.6 0.0 - 1.3 10e9/L    Absolute Eosinophils 0.3 0.0 - 0.7 10e9/L    Absolute Basophils 0.0 0.0 - 0.2 10e9/L    Diff Method  Automated Method

## 2021-07-24 ENCOUNTER — HEALTH MAINTENANCE LETTER (OUTPATIENT)
Age: 21
End: 2021-07-24

## 2021-09-18 ENCOUNTER — HEALTH MAINTENANCE LETTER (OUTPATIENT)
Age: 21
End: 2021-09-18

## 2022-04-28 ENCOUNTER — OFFICE VISIT (OUTPATIENT)
Dept: PEDIATRICS | Facility: CLINIC | Age: 22
End: 2022-04-28
Payer: COMMERCIAL

## 2022-04-28 VITALS
BODY MASS INDEX: 36.54 KG/M2 | HEIGHT: 72 IN | TEMPERATURE: 98 F | RESPIRATION RATE: 16 BRPM | DIASTOLIC BLOOD PRESSURE: 64 MMHG | HEART RATE: 84 BPM | WEIGHT: 269.8 LBS | SYSTOLIC BLOOD PRESSURE: 126 MMHG

## 2022-04-28 DIAGNOSIS — F32.1 CURRENT MODERATE EPISODE OF MAJOR DEPRESSIVE DISORDER WITHOUT PRIOR EPISODE (H): ICD-10-CM

## 2022-04-28 DIAGNOSIS — F41.9 ANXIETY: Primary | ICD-10-CM

## 2022-04-28 PROBLEM — F32.9 MAJOR DEPRESSION, SINGLE EPISODE: Status: ACTIVE | Noted: 2022-04-28

## 2022-04-28 PROCEDURE — 90472 IMMUNIZATION ADMIN EACH ADD: CPT | Performed by: PEDIATRICS

## 2022-04-28 PROCEDURE — 99215 OFFICE O/P EST HI 40 MIN: CPT | Mod: 25 | Performed by: PEDIATRICS

## 2022-04-28 PROCEDURE — 90471 IMMUNIZATION ADMIN: CPT | Performed by: PEDIATRICS

## 2022-04-28 PROCEDURE — 90651 9VHPV VACCINE 2/3 DOSE IM: CPT | Performed by: PEDIATRICS

## 2022-04-28 PROCEDURE — 90632 HEPA VACCINE ADULT IM: CPT | Performed by: PEDIATRICS

## 2022-04-28 RX ORDER — SERTRALINE HYDROCHLORIDE 25 MG/1
25 TABLET, FILM COATED ORAL DAILY
Qty: 30 TABLET | Refills: 0 | Status: SHIPPED | OUTPATIENT
Start: 2022-04-28 | End: 2024-01-03

## 2022-04-28 ASSESSMENT — PATIENT HEALTH QUESTIONNAIRE - PHQ9
SUM OF ALL RESPONSES TO PHQ QUESTIONS 1-9: 13
SUM OF ALL RESPONSES TO PHQ QUESTIONS 1-9: 13
10. IF YOU CHECKED OFF ANY PROBLEMS, HOW DIFFICULT HAVE THESE PROBLEMS MADE IT FOR YOU TO DO YOUR WORK, TAKE CARE OF THINGS AT HOME, OR GET ALONG WITH OTHER PEOPLE: VERY DIFFICULT

## 2022-04-28 ASSESSMENT — ANXIETY QUESTIONNAIRES
1. FEELING NERVOUS, ANXIOUS, OR ON EDGE: MORE THAN HALF THE DAYS
4. TROUBLE RELAXING: MORE THAN HALF THE DAYS
2. NOT BEING ABLE TO STOP OR CONTROL WORRYING: MORE THAN HALF THE DAYS
6. BECOMING EASILY ANNOYED OR IRRITABLE: MORE THAN HALF THE DAYS
GAD7 TOTAL SCORE: 14
5. BEING SO RESTLESS THAT IT IS HARD TO SIT STILL: MORE THAN HALF THE DAYS
7. FEELING AFRAID AS IF SOMETHING AWFUL MIGHT HAPPEN: MORE THAN HALF THE DAYS
GAD7 TOTAL SCORE: 14
3. WORRYING TOO MUCH ABOUT DIFFERENT THINGS: MORE THAN HALF THE DAYS
7. FEELING AFRAID AS IF SOMETHING AWFUL MIGHT HAPPEN: MORE THAN HALF THE DAYS
GAD7 TOTAL SCORE: 14

## 2022-04-28 ASSESSMENT — PAIN SCALES - GENERAL: PAINLEVEL: MODERATE PAIN (5)

## 2022-04-28 ASSESSMENT — ENCOUNTER SYMPTOMS: NERVOUS/ANXIOUS: 1

## 2022-04-28 NOTE — PATIENT INSTRUCTIONS
"Start sertraline 25 mg daily  Same time every day  Watch for lower mood, more agitation - let us know right away  Follow in one month - try am appointment, fastins  Second HPV vaccine then     Try to add more activity into your life - start with small goals    __________________________________________________________________      Psychology Services         If you feel you or your child are in imminent danger of harming yourself or someone else you need to go to [facility] at [facility] or the [facility]      CRISIS TEXT LINE    Text \"START\" to [id number]    Some people might feel more comfortable using  this than a crisis phone service.  It is free, confidential and available 24/7  _________________________________    CRISIS CONNECTION [phone]    [facility] Mobile Crisis Unit  [phone]    [facility] Adult Mental Health urgent care [phone] ([phone]  __________________________________________________________________    Free Mental Health Screening:    [facility] [phone]  __________________________________________________________________    [email or web address]  Online resource for referrals for mental health and substance use concerns      _____________________________________________________________________    Many patients have been happy with these providers:  Check your insurance to find out which providers are covered. Please let us know if you have a hard time getting an appointment scheduled.           Adolescent boys:  Trenton Espinal: [place], [phone]   Dr. Aiden Sears:  [place], [phone]  Foreign Cortes: [place]  [phone]  Maxwell Vieyra: [place] [phone]  Waylon Laws [place] [phone]      General:  [facility] [place] [phone]  [facility] [phone]  [facility], [place] :  [phone]; [email or web address]  [facility] [phone]   Psych " [facility], [facility] - [phone]  [facility]- [facility] -   [place] [facility] - Psychiatry and counseling services - Angela Gomes [place]  [phone]    [facility]  [place] [facility] - Sarah Yoon [place] - [phone]    [facility] [phone]     [facility] - [email or web address] - [facility] - variety of services including counseling, chemical dependency     [facility] / Benny Camden [phone] - autism, parent education

## 2022-04-28 NOTE — PROGRESS NOTES
"Assessment & Plan     Anxiety    - sertraline (ZOLOFT) 25 MG tablet  Dispense: 30 tablet; Refill: 0    Current moderate episode of major depressive disorder without prior episode (H)    Obesity    Strongly encouraged counseling  Trial of sertaline 25 mg daily   Reviewed side effects, including black box warning re suicidal ideation, importance of talking to someone right away if feeling worse. Vidal agrees that he would talk to his mother  Follow up with PCP in one month.   HPV #2 due then  Dicussed adding exercise for physical health and as part of mental health care plan      Provider  Link to [facility] Help Grid :[id number]}     BMI:   Estimated body mass index is 36.34 kg/m  as calculated from the following:    Height as of this encounter: 6' 0.24\" (1.835 m).    Weight as of this encounter: 269 lb 12.8 oz (122.4 kg).   Weight management plan: Discussed healthy diet and exercise guidelines    Depression Screening Follow Up    PHQ 4/28/2022   PHQ-9 Total Score 13   Q9: Thoughts of better off dead/self-harm past 2 weeks Several days   F/U: Thoughts of suicide or self-harm No   F/U: Self harm-plan -   F/U: Self-harm action -   F/U: Safety concerns No     Last PHQ-9 4/28/2022   1.  Little interest or pleasure in doing things 2   2.  Feeling down, depressed, or hopeless 2   3.  Trouble falling or staying asleep, or sleeping too much 2   4.  Feeling tired or having little energy 2   5.  Poor appetite or overeating 0   6.  Feeling bad about yourself 2   7.  Trouble concentrating 2   8.  Moving slowly or restless 0   Q9: Thoughts of better off dead/self-harm past 2 weeks 1   PHQ-9 Total Score 13   Difficulty at work, home, or with people -   In the past two weeks have you had thoughts of suicide or self harm? No   Do you have concerns about your personal safety or the safety of others? No   In the past 2 weeks have you thought about a plan or had intention to harm yourself? -   In the past 2 weeks have you acted on these thoughts " in any way? -     Follow Up      Follow Up Actions Taken  Crisis resource information provided in the After Visit Summary  Return in about 1 month (around 5/28/2022) for Routine preventive and follow up.    Saira Jones MD  [facility] TAMIKA Drake is a 21 year old who presents for the following health issues accompanied by his mother.    Anxiety    History of Present Illness       Mental Health Follow-up:  Patient presents to follow-up on Depression & Anxiety.Patient's depression since last visit has been:  Bad  The patient is having other symptoms associated with depression.  Patient's anxiety since last visit has been:  Bad  The patient is having other symptoms associated with anxiety.  Any significant life events: other  Patient is feeling anxious or having panic attacks.  Patient has no concerns about alcohol or drug use.       Today's PHQ-9         PHQ-9 Total Score: 13  PHQ-9 Q9 Thoughts of better off dead/self-harm past 2 weeks :   (P) Several days  Thoughts of suicide or self harm: (P) No  Self-harm Plan:     Self-harm Action:       Safety concerns for self or others: (P) No    How difficult have these problems made it for you to do your work, take care of things at home, or get along with other people: Very difficult    Today's MAXIMUS-7 Score: 14    He eats 0-1 servings of fruits and vegetables daily.He consumes 0 sweetened beverage(s) daily.He exercises with enough effort to increase his heart rate 10 to 19 minutes per day.  He exercises with enough effort to increase his heart rate 4 days per week.   He is taking medications regularly.     ALERT  Recent PHQ-9 score indicates suicidal ideations :[id number]}{Provider Documentation      Patient presents to the clinic with his mom for panic attacks. He states he recently has been struggling with his anxiety and having panic attacks. He has been having difficulties with panic attacks for about a year and a half. He has had anxiety  "since he was very young. Mom reports she is worried about the patient because of his panic attacks and heightened anxiety. A few months ago, he states he was not feeling himself and felt like he was having a \"heart attack\" while he was on the deck outside. Mom states he has called 911 twice while she was at work. Patient reports he was seeing and hearing things that were not actually there. He has had 3 panic attacks in this past month alone. There is family history of anxiety and depression. Mom has anxiety and was on fluoxetine and xanax. She is not currently on any medications for her anxiety, but states that fluoxetine worked well for her. Mom has given him 3 pills of xanax for his anxiety in the past year. The xanax was effective in limiting his anxiety. Mom states his maternal grandpa is currently doing chemotherapy which is adding into his anxiety. Mom reports this is the first time that he has left the house and went to see a doctor in [facility]. He was scheduled to see another doctor a month ago, but refused to go. He states he left to come here today because his mood is affecting him more than it has in the past.  He has however, felt like his anxiety has been better since March when his family went on a cruise to the [facility].   Patient was seen a year ago for anxiety and was prescribed fluoxetine and hydroxyzine. He only took the fluoxetine and hydroxyzine for about a month before he stopped. He did not feel like either medication was helping with his anxiety. Patient reports he stopped taking both medications because he thought the ringing in his ears was a side effect due to the medications. Mom reports did not have any panic attacks while he was on fluoxetine and hydroxyzine. Mom states patient was diagnosed with ADHD when he was younger and stopped taking his prescribed medications because it affected his mood. He reports he was not himself and had headaches, constipation, low appetite, and constipation. " "He did see a therapist about five years ago, but stopped because he was seeing a counselor at school. He graduated in 2020 from [employer]. He is not currently working or doing any post secondary education. Patient did alright in school and mom reports it was tough for him to get through school. He lives at home with his mom and their dog. Patient and his mom recently moved to [place] because her aunt went into assisted living and they are renting her house. Mom is a nurse at [employer]. He gets along well with his mom.     Patient has not had any suicidal ideations and if it did get worse he would talk to his mom. There is no family history of high blood pressure, but there is high cholesterol. Paternal grandpa has type 1 diabetes. Patient reports his weight has changed recently, but wants to start losing weight. He does not do much for activity and spends a majority of his time on a screen.  He does not have a great diet. He mostly drinks water or milk. He does not drink much sugary beverages. He does not smoke, vape, or use other substances. He does get tension headaches every couple days. His sleep has been normal. He goes to bed at 8-9 pm and wakes up around 8-9 am. He does have some daytime sleepiness. He does snore some, but not excessively.    Primary Care provider is at [facility]. Came here today because this was first available opening.     Review of Systems   Psychiatric/Behavioral: The patient is nervous/anxious.       Constitutional, HEENT, cardiovascular, pulmonary, gi and gu systems are negative, except as otherwise noted.      Objective    /64 (BP Location: Right arm, Patient Position: Sitting, Cuff Size: Adult Large)   Pulse 84   Temp 98  F (36.7  C) (Oral)   Resp 16   Ht 6' 0.24\" (1.835 m)   Wt 269 lb 12.8 oz (122.4 kg)   BMI 36.34 kg/m    Body mass index is 36.34 kg/m .  Physical Exam   General Appearance: Alert and no distress, appears stated age. overweight  Head: " Normocephalic, without obvious abnormality, atraumatic  Eyes: PERRL, conjunctiva/corneas clear  Ears: Normal TM's and external ear canals, both ears  Nose: Nares normal, mucosa normal  Throat: Moist mucosa, post pharynx clear  Neck: Supple, no adenopathy  Lungs: Clear to auscultation bilaterally, no crackles or wheeze, no increased work of breathing  Heart: Regular rate and rhythm, S1 and S2 normal, no murmur, rub   or gallop  Skin: Skin color, texture, turgor normal, no rashes or lesions  Neurologic:  Affect is flat    ADDITIONAL HISTORY SUMMARIZED (2): None.  DECISION TO OBTAIN EXTRA INFORMATION (1): None.   RADIOLOGY TESTS (1): None.  LABS (1): None.  MEDICINE TESTS (1): None.  INDEPENDENT REVIEW (2 each): None.     Time in: 7:57 am  Time out: 8:32 am    The visit lasted a total of 45 minutes spent on the date of the encounter doing chart review, history and exam, documentation, and further activities as noted above.     I, Reji Contreras, am scribing for and in the presence of, Dr. Jones.    I, Dr. Jones, personally performed the services described in this documentation, as scribed by Reji Contreras in my presence, and it is both accurate and complete.    Total data points: 0

## 2022-04-29 ASSESSMENT — ANXIETY QUESTIONNAIRES: GAD7 TOTAL SCORE: 14

## 2022-04-29 ASSESSMENT — PATIENT HEALTH QUESTIONNAIRE - PHQ9: SUM OF ALL RESPONSES TO PHQ QUESTIONS 1-9: 13

## 2022-06-24 ENCOUNTER — HOSPITAL ENCOUNTER (EMERGENCY)
Facility: CLINIC | Age: 22
Discharge: HOME | End: 2022-06-24
Attending: NURSE PRACTITIONER | Admitting: NURSE PRACTITIONER
Payer: COMMERCIAL

## 2022-06-24 VITALS
RESPIRATION RATE: 14 BRPM | OXYGEN SATURATION: 96 % | TEMPERATURE: 98.9 F | HEART RATE: 89 BPM | BODY MASS INDEX: 33.86 KG/M2 | HEIGHT: 72 IN | DIASTOLIC BLOOD PRESSURE: 76 MMHG | WEIGHT: 250 LBS | SYSTOLIC BLOOD PRESSURE: 143 MMHG

## 2022-06-24 DIAGNOSIS — Z20.9 EXPOSURE TO BAT WITHOUT KNOWN BITE: ICD-10-CM

## 2022-06-24 PROCEDURE — 96372 THER/PROPH/DIAG INJ SC/IM: CPT | Performed by: NURSE PRACTITIONER

## 2022-06-24 PROCEDURE — 90675 RABIES VACCINE IM: CPT | Performed by: NURSE PRACTITIONER

## 2022-06-24 PROCEDURE — 99284 EMERGENCY DEPT VISIT MOD MDM: CPT | Mod: 25

## 2022-06-24 PROCEDURE — 90375 RABIES IG IM/SC: CPT | Performed by: NURSE PRACTITIONER

## 2022-06-24 PROCEDURE — 90715 TDAP VACCINE 7 YRS/> IM: CPT | Performed by: NURSE PRACTITIONER

## 2022-06-24 PROCEDURE — 250N000011 HC RX IP 250 OP 636: Performed by: NURSE PRACTITIONER

## 2022-06-24 PROCEDURE — 90472 IMMUNIZATION ADMIN EACH ADD: CPT | Performed by: NURSE PRACTITIONER

## 2022-06-24 PROCEDURE — 90471 IMMUNIZATION ADMIN: CPT | Performed by: NURSE PRACTITIONER

## 2022-06-24 RX ORDER — DIPHENHYDRAMINE HYDROCHLORIDE 50 MG/ML
50 INJECTION INTRAMUSCULAR; INTRAVENOUS
Status: CANCELLED
Start: 2022-06-24

## 2022-06-24 RX ORDER — EPINEPHRINE 1 MG/ML
0.3 INJECTION, SOLUTION, CONCENTRATE INTRAVENOUS EVERY 5 MIN PRN
Status: CANCELLED | OUTPATIENT
Start: 2022-06-24

## 2022-06-24 RX ORDER — MEPERIDINE HYDROCHLORIDE 25 MG/ML
25 INJECTION INTRAMUSCULAR; INTRAVENOUS; SUBCUTANEOUS EVERY 30 MIN PRN
Status: CANCELLED | OUTPATIENT
Start: 2022-06-24

## 2022-06-24 RX ORDER — METHYLPREDNISOLONE SODIUM SUCCINATE 125 MG/2ML
125 INJECTION, POWDER, LYOPHILIZED, FOR SOLUTION INTRAMUSCULAR; INTRAVENOUS
Status: CANCELLED
Start: 2022-06-24

## 2022-06-24 RX ORDER — NALOXONE HYDROCHLORIDE 0.4 MG/ML
0.2 INJECTION, SOLUTION INTRAMUSCULAR; INTRAVENOUS; SUBCUTANEOUS
Status: CANCELLED | OUTPATIENT
Start: 2022-06-24

## 2022-06-24 RX ORDER — ALBUTEROL SULFATE 0.83 MG/ML
2.5 SOLUTION RESPIRATORY (INHALATION)
Status: CANCELLED | OUTPATIENT
Start: 2022-06-24

## 2022-06-24 RX ORDER — ALBUTEROL SULFATE 90 UG/1
1-2 AEROSOL, METERED RESPIRATORY (INHALATION)
Status: CANCELLED
Start: 2022-06-24

## 2022-06-24 RX ADMIN — CLOSTRIDIUM TETANI TOXOID ANTIGEN (FORMALDEHYDE INACTIVATED), CORYNEBACTERIUM DIPHTHERIAE TOXOID ANTIGEN (FORMALDEHYDE INACTIVATED), BORDETELLA PERTUSSIS TOXOID ANTIGEN (GLUTARALDEHYDE INACTIVATED), BORDETELLA PERTUSSIS FILAMENTOUS HEMAGGLUTININ ANTIGEN (FORMALDEHYDE INACTIVATED), BORDETELLA PERTUSSIS PERTACTIN ANTIGEN, AND BORDETELLA PERTUSSIS FIMBRIAE 2/3 ANTIGEN 0.5 ML: 5; 2; 2.5; 5; 3; 5 INJECTION, SUSPENSION INTRAMUSCULAR at 17:16

## 2022-06-24 RX ADMIN — RABIES IMMUNE GLOBULIN (HUMAN) 2280 UNITS: 300 INJECTION, SOLUTION INFILTRATION; INTRAMUSCULAR at 17:15

## 2022-06-24 RX ADMIN — Medication 1 ML: at 17:17

## 2022-06-24 ASSESSMENT — ENCOUNTER SYMPTOMS: WOUND: 0

## 2022-06-24 NOTE — ED PROVIDER NOTES
History   Chief Complaint:  Bat Exposure      HPI   Vidal Merida is a 21 year old male who presents for evaluation of bat exposure. Last night the patient was in his basement when a bat flew over his head into the rest of his house. Later he noticed that the bat was in his room, and he questions whether he may have accidentally stepped on it because it started squeaking and he noticed it on the floor where he had been walking. Due to concern for his exposure to this bat he came into the ED today. He did not have any known bites from the bat. His tetanus status was last updated 3/26/2013.     Review of Systems   Skin: Negative for wound.   All other systems reviewed and are negative.    Allergies:  No known drug allergies      Medications:  Zoloft     Past Medical History:     Anxiety  Depression  Autism spectrum disorder  ADHD    Eczema      Family History:    Allergies - Father   Cystic fibrosis - Brother     Social History:  The patient presents to the ED alone.   Tetanus last updated 3/26/2013.     Physical Exam     Patient Vitals for the past 24 hrs:   BP Temp Temp src Pulse Resp SpO2 Height Weight   06/24/22 1459 (!) 143/76 98.9  F (37.2  C) Temporal 89 14 96 % 1.829 m (6') 113.4 kg (250 lb)       Physical Exam  Nursing notes reviewed. Vitals reviewed.  General: Alert. Well kept.  Eyes:  Conjunctiva non-injected, non-icteric.  Neck/Throat: Moist mucous membranes. Normal voice.  Cardiac: Regular rhythm. Normal heart sounds.  Pulmonary: Clear and equal breath sounds bilaterally.   Musculoskeletal: Normal gross range of motion of all 4 extremities.   Neurological: Alert and oriented x4.   Skin: Warm and dry. Normal appearance of visualized exposed skin without rashes or petechiae.  Psych: Affect normal. Good eye contact.    Emergency Department Course     Emergency Department Course:     Reviewed:  I reviewed nursing notes, vitals and past medical history    Assessments:  1632:  I obtained history and  examined the patient as noted above.     1723: I updated and reassessed the patient.     Interventions:  1715 Hyperab 2,280 units IM   1716 Tdap 0.5 mL IM   1717 Rabavert 1 mL IM     Disposition:  The patient was discharged to home.     Impression & Plan     Medical Decision Making:  Vidal Merida is a 21 year old male who presents for bat exposure without know bite.  The bat was not caught and therefore not able to be tested. I discussed the risks of a occult bat bite with a possible rabies transmission using guidance from the [place] Department of Health and though at low risk, the patient decided to undergo rabies vaccination series. This was facilitated.  The patient was given the immunization schedule and orders were faxed to the infusion center.  Tetanus was updated.    Diagnosis:    ICD-10-CM    1. Exposure to bat without known bite  Z20.9         Scribe Disclosure:  I, Kyler Cheek, am serving as a scribe at 4:32 PM on 6/24/2022 to document services personally performed by Dimple Birch, NATALEE based on my observations and the provider's statements to me.           Dimple Birch, CNP  06/25/22 0001

## 2022-06-24 NOTE — ED TRIAGE NOTES
Pt reports having a bat in his house/room. Pt is unsure if he was bit.      Triage Assessment     Row Name 06/24/22 7925       Triage Assessment (Adult)    Airway WDL WDL       Respiratory WDL    Respiratory WDL WDL       Skin Circulation/Temperature WDL    Skin Circulation/Temperature WDL WDL       Cardiac WDL    Cardiac WDL WDL       Peripheral/Neurovascular WDL    Peripheral Neurovascular WDL WDL       Cognitive/Neuro/Behavioral WDL    Cognitive/Neuro/Behavioral WDL WDL

## 2022-06-27 ENCOUNTER — TELEPHONE (OUTPATIENT)
Dept: PEDIATRICS | Facility: CLINIC | Age: 22
End: 2022-06-27

## 2022-06-27 ENCOUNTER — OFFICE VISIT (OUTPATIENT)
Dept: URGENT CARE | Facility: URGENT CARE | Age: 22
End: 2022-06-27
Payer: COMMERCIAL

## 2022-06-27 DIAGNOSIS — Z23 NEED FOR VACCINATION: Primary | ICD-10-CM

## 2022-06-27 PROCEDURE — 99207 PR NO CHARGE NURSE ONLY: CPT

## 2022-06-27 PROCEDURE — 90471 IMMUNIZATION ADMIN: CPT

## 2022-06-27 PROCEDURE — 90675 RABIES VACCINE IM: CPT

## 2022-06-27 NOTE — TELEPHONE ENCOUNTER
Pt calling to report being seen in [facility] ED on 6/24/22 for exposure to bat without a known bite.  Received his immune globulin and first vaccine at that time.  Noticed just today that he was due for next injection today but location where he was directed has no openings.  Advised our  location can do a second vaccine and we are open until 8pm.  Pt will come tonastrid.    NICHOL King RN  [facility]

## 2022-07-11 ENCOUNTER — HOSPITAL ENCOUNTER (EMERGENCY)
Facility: CLINIC | Age: 22
Discharge: HOME | End: 2022-07-11
Attending: EMERGENCY MEDICINE | Admitting: EMERGENCY MEDICINE

## 2022-07-11 ENCOUNTER — APPOINTMENT (OUTPATIENT)
Dept: GENERAL RADIOLOGY | Facility: CLINIC | Age: 22
End: 2022-07-11
Attending: EMERGENCY MEDICINE

## 2022-07-11 ENCOUNTER — NURSE TRIAGE (OUTPATIENT)
Dept: NURSING | Facility: CLINIC | Age: 22
End: 2022-07-11

## 2022-07-11 VITALS
HEART RATE: 71 BPM | DIASTOLIC BLOOD PRESSURE: 82 MMHG | TEMPERATURE: 98.3 F | OXYGEN SATURATION: 94 % | RESPIRATION RATE: 18 BRPM | BODY MASS INDEX: 32.47 KG/M2 | WEIGHT: 245 LBS | SYSTOLIC BLOOD PRESSURE: 135 MMHG | HEIGHT: 73 IN

## 2022-07-11 DIAGNOSIS — J06.9 UPPER RESPIRATORY TRACT INFECTION, UNSPECIFIED TYPE: ICD-10-CM

## 2022-07-11 LAB
DEPRECATED S PYO AG THROAT QL EIA: NEGATIVE
FLUAV RNA SPEC QL NAA+PROBE: NEGATIVE
FLUBV RNA RESP QL NAA+PROBE: NEGATIVE
GROUP A STREP BY PCR: NOT DETECTED
RSV RNA SPEC NAA+PROBE: NEGATIVE
SARS-COV-2 RNA RESP QL NAA+PROBE: NEGATIVE

## 2022-07-11 PROCEDURE — 87637 SARSCOV2&INF A&B&RSV AMP PRB: CPT | Performed by: EMERGENCY MEDICINE

## 2022-07-11 PROCEDURE — 71046 X-RAY EXAM CHEST 2 VIEWS: CPT

## 2022-07-11 PROCEDURE — 87651 STREP A DNA AMP PROBE: CPT | Performed by: EMERGENCY MEDICINE

## 2022-07-11 PROCEDURE — 99284 EMERGENCY DEPT VISIT MOD MDM: CPT | Mod: CS,25

## 2022-07-11 PROCEDURE — C9803 HOPD COVID-19 SPEC COLLECT: HCPCS

## 2022-07-11 ASSESSMENT — ENCOUNTER SYMPTOMS
SORE THROAT: 1
VOICE CHANGE: 1
COUGH: 1
FEVER: 0

## 2022-07-11 NOTE — ED PROVIDER NOTES
"  History   Chief Complaint:  Pharyngitis and Cough       HPI   Vidal Merida is a 21 year old male with history of anxiety who presents with sore throat, cough and hoarseness that has been worsening over the past week. This morning he coughed up mucus which had a small amount of dark colored blood in it. He has been taking Aleve. He has been able to drink water. Denies fever.    Review of Systems   Constitutional: Negative for fever.   HENT: Positive for sore throat and voice change.    Respiratory: Positive for cough.    All other systems reviewed and are negative.      Allergies:  The patient has no known allergies.     Medications:  Zoloft    Past Medical History:     Eczema  ADHD  Pervasive developmental disorder  Cellulitis  Anxiety  Autism spectrum disorder  Depression    Family History:    Father - allergies  Mother - genetic disorder  Brother - respiratory     Social History:  The patient presents to the ED with his mother.    Physical Exam     Patient Vitals for the past 24 hrs:   BP Temp Temp src Pulse Resp SpO2 Height Weight   07/11/22 1000 135/82 -- -- -- -- 94 % -- --   07/11/22 0725 (!) 149/73 98.3  F (36.8  C) Temporal 71 18 97 % 1.854 m (6' 1\") 111.1 kg (245 lb)       Physical Exam  Constitutional: White male, sitting. In no distress  HENT: No signs of trauma. Oropharynx clear. No redness or discharge.  Eyes: EOM are normal. Pupils are equal, round, and reactive to light.   Neck: Normal range of motion. No JVD present. No cervical adenopathy.  Cardiovascular: Regular rhythm.  Exam reveals no gallop and no friction rub.    No murmur heard.  Pulmonary/Chest: Bilateral breath sounds normal. No wheezes, rhonchi or rales. No stridor or respiratory distress.  Abdominal: Soft. No tenderness. No rebound or guarding.   Musculoskeletal: No edema. No tenderness.   Lymphadenopathy: No lymphadenopathy.   Neurological: Alert and oriented to person, place, and time. Normal strength. Coordination normal. "   Skin: Skin is warm and dry. No rash noted. No erythema.     Emergency Department Course     Imaging:  Chest XR,  PA & LAT   Final Result   IMPRESSION: No infiltrate, pleural effusion or pneumothorax. The   cardiac and mediastinal silhouettes are normal.      ARIANNA CARTER MD            SYSTEM ID:  [id number]        Report per radiology    Laboratory:  Labs Ordered and Resulted from Time of ED Arrival to Time of ED Departure   INFLUENZA A/B & SARS-COV2 PCR MULTIPLEX - Normal       Result Value    Influenza A PCR Negative      Influenza B PCR Negative      RSV PCR Negative      SARS CoV2 PCR Negative     STREPTOCOCCUS A RAPID SCREEN W REFELX TO PCR - Normal    Group A Strep antigen Negative     GROUP A STREPTOCOCCUS PCR THROAT SWAB          Emergency Department Course:       Reviewed:  I reviewed nursing notes, vitals, past medical history and Care Everywhere    Assessments:  0932 I obtained history and examined the patient as noted above.   1048 I rechecked the patient and explained findings.     Disposition:  The patient was discharged to home.     Impression & Plan     Medical Decision Making:  This is a 21 year old who has had some sore throat and mild cough on and off for a week. No fevers. No chills. There was maybe a little dry blood in the cough today. On exam, he is in no distress, he is non febrile, his sats are good, he has clear lung sounds, his oropharynx looks clear.  Rapid strep and COVID are negative and chest x-ray is unremarkable. This is consistent with upper respiratory infection. Patient will be treated with conservative means. Follow up in the ED if symptoms worsen otherwise follow up with his PMD if symptoms persist.      Diagnosis:    ICD-10-CM    1. Upper respiratory tract infection, unspecified type  J06.9        Scribe Disclosure:  Cassy DOW, am serving as a scribe at 9:28 AM on 7/11/2022 to document services personally performed by Florentino Awad MD based on my observations and  the provider's statements to me.              Florentino Awad MD  07/11/22 9245

## 2022-07-11 NOTE — ED TRIAGE NOTES
Pt started having sore throat and cough yesterday. This morning, states coughed a small amount of blood up with mucus. Mom states that everything started after getting rabies vaccine.      Triage Assessment     Row Name 07/11/22 1361       Respiratory WDL    Respiratory WDL WDL       Cardiac WDL    Cardiac WDL WDL       Cognitive/Neuro/Behavioral WDL    Cognitive/Neuro/Behavioral WDL WDL

## 2022-07-11 NOTE — TELEPHONE ENCOUNTER
Mother calling- verbal consent given by patient.    Says patient has had a cough for two weeks and it seems to be getting worse.  He had a negative home covid test yesterday.  Today he coughed up a small amount of blood tinged sputum.  Is in the ED now but there is a long wait.  They triaged him at the ED, did a covid test and strep test but they are in the waiting room.  Mother is calling to schedule a clinic appointment for today.    No difficulty breathing.  No chest pain.  No fever.    Triaged to disposition of See Today in Office.  Transferred to scheduling.  Advised to call back if symptoms worsen.    Sindhu Orozco RN  Triage Nurse Advisor    Reason for Disposition    Coughing up librado-colored (reddish-brown) or blood-tinged sputum    Additional Information    Negative: Bluish (or gray) lips or face    Negative: Severe difficulty breathing (e.g., struggling for each breath, speaks in single words)    Negative: Coughing started suddenly after medicine, an allergic food or bee sting    Negative: Rapid onset of cough and has hives    Negative: Difficulty breathing after exposure to flames, smoke, or fumes    Negative: Sounds like a life-threatening emergency to the triager    Negative: Previous asthma attacks and this feels like asthma attack    Negative: Chest pain present when not coughing    Negative: Difficulty breathing    Negative: Passed out (i.e., fainted, collapsed and was not responding)    Negative: Patient sounds very sick or weak to the triager    Negative: Coughed up > 1 tablespoon (15 ml) blood (Exception: blood-tinged sputum)    Negative: Fever > 103 F (39.4 C)    Negative: Fever > 101 F (38.3 C) and over 60 years of age    Negative: Fever > 100.0 F (37.8 C) and has diabetes mellitus or a weak immune system (e.g., HIV positive, cancer chemotherapy, organ transplant, splenectomy, chronic steroids)    Negative: Fever > 100.0 F (37.8 C) and bedridden (e.g., nursing home patient, stroke, chronic  illness, recovering from surgery)    Negative: Increasing ankle swelling    Negative: Wheezing is present    Negative: SEVERE coughing spells (e.g., whooping sound after coughing, vomiting after coughing)    Protocols used: COUGH-A-OH

## 2022-07-12 ENCOUNTER — PATIENT OUTREACH (OUTPATIENT)
Dept: CARE COORDINATION | Facility: CLINIC | Age: 22
End: 2022-07-12

## 2022-07-12 DIAGNOSIS — Z71.89 OTHER SPECIFIED COUNSELING: ICD-10-CM

## 2022-07-12 NOTE — PROGRESS NOTES
Clinic Care Coordination Contact  [facility]/Voicemail       Clinical Data: Care Coordinator Outreach  Outreach attempted x 1.  Left message on patient's voicemail with call back information and requested return call.  Plan: Care Coordinator will try to reach patient again in 1-2 business days.    .Leigha STEINER Community Health Worker  Clinic Care Coordination  [facility]  Phone: [phone]

## 2022-07-13 NOTE — PROGRESS NOTES
Clinic Care Coordination Contact  [facility]/Voicemail       Clinical Data: Care Coordinator Outreach  Outreach attempted x 2.  Left message on patient's voicemail with call back information and requested return call.  Plan:  Care Coordinator will do no further outreaches at this time.    Leigha STEINER Community Health Worker  Clinic Care Coordination  [facility]  Phone: [phone]

## 2022-07-19 ENCOUNTER — OFFICE VISIT (OUTPATIENT)
Dept: FAMILY MEDICINE | Facility: CLINIC | Age: 22
End: 2022-07-19

## 2022-07-19 VITALS
HEIGHT: 73 IN | WEIGHT: 240 LBS | DIASTOLIC BLOOD PRESSURE: 82 MMHG | HEART RATE: 80 BPM | TEMPERATURE: 97.7 F | RESPIRATION RATE: 16 BRPM | BODY MASS INDEX: 31.81 KG/M2 | SYSTOLIC BLOOD PRESSURE: 112 MMHG | OXYGEN SATURATION: 96 %

## 2022-07-19 DIAGNOSIS — L98.9 SKIN LESION: Primary | ICD-10-CM

## 2022-07-19 DIAGNOSIS — J06.9 UPPER RESPIRATORY TRACT INFECTION, UNSPECIFIED TYPE: ICD-10-CM

## 2022-07-19 PROCEDURE — 99212 OFFICE O/P EST SF 10 MIN: CPT | Performed by: FAMILY MEDICINE

## 2022-07-19 ASSESSMENT — ANXIETY QUESTIONNAIRES
6. BECOMING EASILY ANNOYED OR IRRITABLE: SEVERAL DAYS
3. WORRYING TOO MUCH ABOUT DIFFERENT THINGS: SEVERAL DAYS
GAD7 TOTAL SCORE: 6
2. NOT BEING ABLE TO STOP OR CONTROL WORRYING: SEVERAL DAYS
GAD7 TOTAL SCORE: 6
4. TROUBLE RELAXING: SEVERAL DAYS
8. IF YOU CHECKED OFF ANY PROBLEMS, HOW DIFFICULT HAVE THESE MADE IT FOR YOU TO DO YOUR WORK, TAKE CARE OF THINGS AT HOME, OR GET ALONG WITH OTHER PEOPLE?: SOMEWHAT DIFFICULT
1. FEELING NERVOUS, ANXIOUS, OR ON EDGE: SEVERAL DAYS
5. BEING SO RESTLESS THAT IT IS HARD TO SIT STILL: NOT AT ALL
7. FEELING AFRAID AS IF SOMETHING AWFUL MIGHT HAPPEN: SEVERAL DAYS
GAD7 TOTAL SCORE: 6
7. FEELING AFRAID AS IF SOMETHING AWFUL MIGHT HAPPEN: SEVERAL DAYS

## 2022-07-19 ASSESSMENT — PATIENT HEALTH QUESTIONNAIRE - PHQ9
10. IF YOU CHECKED OFF ANY PROBLEMS, HOW DIFFICULT HAVE THESE PROBLEMS MADE IT FOR YOU TO DO YOUR WORK, TAKE CARE OF THINGS AT HOME, OR GET ALONG WITH OTHER PEOPLE: SOMEWHAT DIFFICULT
SUM OF ALL RESPONSES TO PHQ QUESTIONS 1-9: 6
SUM OF ALL RESPONSES TO PHQ QUESTIONS 1-9: 6

## 2022-07-19 NOTE — PROGRESS NOTES
"  Assessment & Plan     Skin lesion  On scrotum.  Appears either simple cyst or ingrown hair.  Discussed warm compression and monitor.  Reassured it is not a sign of anything serious.  If symptoms persist or if getting worse or if he is interested in surgical intervention call for dermatology referral.    Upper respiratory tract infection, unspecified type  Recovering.  Denies any concerns.                   No follow-ups on file.    Devin Spencer MD, MD  [facility]    Rip Drake is a 21 year old, presenting for the following health issues:  Hospital F/U and [facility]       ED/UC Followup:    Facility:  [facility]  Date of visit: 07/11/2022  Reason for visit: Upper respiratory tract infection    Current Status: dry cough, no sore throat, no fever or chills, fatigue, sinus pressure and pain, headache, took at home covid test and was negative.    Cold is improving. Still horse voice. Coughing a week ago - mucus but now less coughing and dry cough.   No fever. No sick contact. Took covid test at home and it was negative. In the ER strep, flu - negative.     Additional: pt would like right testicle lump checked, no pain, has noticed for 2 months, is movable.  No pain.   No change since it started. No family history of testicular cancer.  No concern of stds.     Review of Systems         Objective    /82 (BP Location: Right arm, Patient Position: Sitting, Cuff Size: Adult Large)   Pulse 80   Temp 97.7  F (36.5  C) (Temporal)   Resp 16   Ht 1.854 m (6' 1\")   Wt 108.9 kg (240 lb)   SpO2 96%   BMI 31.66 kg/m    Body mass index is 31.66 kg/m .  Physical Exam   Both testicles descended. Right posterior scrotum solitary cystic lesion present on skin. No testicular lesions.                      "

## 2022-08-20 ENCOUNTER — HEALTH MAINTENANCE LETTER (OUTPATIENT)
Age: 22
End: 2022-08-20

## 2022-11-20 ENCOUNTER — HEALTH MAINTENANCE LETTER (OUTPATIENT)
Age: 22
End: 2022-11-20

## 2023-09-10 ENCOUNTER — HEALTH MAINTENANCE LETTER (OUTPATIENT)
Age: 23
End: 2023-09-10

## 2024-01-03 ENCOUNTER — OFFICE VISIT (OUTPATIENT)
Dept: FAMILY MEDICINE | Facility: CLINIC | Age: 24
End: 2024-01-03
Payer: COMMERCIAL

## 2024-01-03 VITALS
OXYGEN SATURATION: 97 % | BODY MASS INDEX: 36.98 KG/M2 | HEART RATE: 105 BPM | HEIGHT: 72 IN | TEMPERATURE: 99.4 F | DIASTOLIC BLOOD PRESSURE: 85 MMHG | RESPIRATION RATE: 18 BRPM | WEIGHT: 273 LBS | SYSTOLIC BLOOD PRESSURE: 138 MMHG

## 2024-01-03 DIAGNOSIS — Z00.00 ROUTINE GENERAL MEDICAL EXAMINATION AT A HEALTH CARE FACILITY: Primary | ICD-10-CM

## 2024-01-03 DIAGNOSIS — Z13.220 SCREENING FOR LIPID DISORDERS: ICD-10-CM

## 2024-01-03 PROCEDURE — 90472 IMMUNIZATION ADMIN EACH ADD: CPT | Performed by: FAMILY MEDICINE

## 2024-01-03 PROCEDURE — 80061 LIPID PANEL: CPT | Performed by: FAMILY MEDICINE

## 2024-01-03 PROCEDURE — 90686 IIV4 VACC NO PRSV 0.5 ML IM: CPT | Performed by: FAMILY MEDICINE

## 2024-01-03 PROCEDURE — 90651 9VHPV VACCINE 2/3 DOSE IM: CPT | Performed by: FAMILY MEDICINE

## 2024-01-03 PROCEDURE — 82947 ASSAY GLUCOSE BLOOD QUANT: CPT | Performed by: FAMILY MEDICINE

## 2024-01-03 PROCEDURE — 90480 ADMN SARSCOV2 VAC 1/ONLY CMP: CPT | Performed by: FAMILY MEDICINE

## 2024-01-03 PROCEDURE — 36415 COLL VENOUS BLD VENIPUNCTURE: CPT | Performed by: FAMILY MEDICINE

## 2024-01-03 PROCEDURE — 99395 PREV VISIT EST AGE 18-39: CPT | Mod: 25 | Performed by: FAMILY MEDICINE

## 2024-01-03 PROCEDURE — 90471 IMMUNIZATION ADMIN: CPT | Performed by: FAMILY MEDICINE

## 2024-01-03 PROCEDURE — 91320 SARSCV2 VAC 30MCG TRS-SUC IM: CPT | Performed by: FAMILY MEDICINE

## 2024-01-03 ASSESSMENT — PATIENT HEALTH QUESTIONNAIRE - PHQ9
10. IF YOU CHECKED OFF ANY PROBLEMS, HOW DIFFICULT HAVE THESE PROBLEMS MADE IT FOR YOU TO DO YOUR WORK, TAKE CARE OF THINGS AT HOME, OR GET ALONG WITH OTHER PEOPLE: SOMEWHAT DIFFICULT
SUM OF ALL RESPONSES TO PHQ QUESTIONS 1-9: 10
SUM OF ALL RESPONSES TO PHQ QUESTIONS 1-9: 10

## 2024-01-03 NOTE — COMMUNITY RESOURCES LIST (ENGLISH)
01/03/2024   [facility]  N/A  For questions about this resource list or additional care needs, please contact your primary care clinic or care manager.  Phone: [phone]   Email: N/A   Address: [address] [place] [address]   Hours: N/A        Transportation       Free or low-cost transportation  1  Small [facility] Distance: 2.78 miles      In-Person   [address] [place] [address]  Language: English, Burmese  Hours: Mon 9:00 AM - 5:00 PM , Tue 9:30 AM - 7:00 PM , Wed 9:00 AM - 5:00 PM , Thu 9:30 AM - 7:00 PM , Fri 9:00 AM - 5:00 PM  Fees: Free   Phone: (398) 997-8393 Email: contactus@[employer] Website: http://www.[employer]     2  [facility] Distance: 4.57 miles      In-Person   [address] [place] [address]  Language: English  Hours: Mon - Fri 8:00 AM - 3:00 PM  Fees: Free   Phone: (557) 177-8330 Ext.14 Email: neighborhood@[facility].[employer] Website: http://www.[facility].[employer]     Transportation to medical appointments  3  [facility] Medical Transportation - Non-Emergency Medical Transportation Distance: 3.33 miles      In-Person   [address] [place] [address]  Language: English  Hours: Mon - Fri 8:00 AM - 4:00 PM Appt. Only  Fees: Self Pay   Phone: (377) 258-3856 Website: http://www.[facility].org/Medical-Services/Emergency-medical-services/Non-emergency-transportation/     4  [facility] Transportation Programs - Non-Emergency Medical Transportation Distance: 3.38 miles      In-Person   [address] [facility] [address] [place] [address]  Language: English, Nigerian, Burmese  Hours: Mon - Fri 7:00 AM - 6:00 PM  Fees: Insurance   Phone: (407) 617-2601 Ext.4488 Email: ricki@[email or web address] Website: http://[email or web address]/[email or web address]/          Important Numbers & Websites       Emergency Services   911  City Services   311  Poison Control   (739) 585-4753  Suicide Prevention Lifeline   (271) 889-6729 (TALK)  Child  Abuse Hotline   (345) 734-3373 (4-A-Child)  Sexual Assault Hotline   (837) 626-2545 (HOPE)  National Runaway Safeline   (966) 262-2916 (RUNAWAY)  All-Options Talkline   (557) 959-3188  Substance Abuse Referral   (840) 462-7219 (HELP)

## 2024-01-03 NOTE — COMMUNITY RESOURCES LIST (ENGLISH)
01/03/2024   [facility]  N/A  For questions about this resource list or additional care needs, please contact your primary care clinic or care manager.  Phone: [phone]   Email: N/A   Address: [address] [place] [address]   Hours: N/A        Transportation       Free or low-cost transportation  1  [facility] Distance: 2.78 miles      In-Person   [address] [place] [address]  Language: English, Dominican  Hours: Mon 9:00 AM - 5:00 PM , Tue 9:30 AM - 7:00 PM , Wed 9:00 AM - 5:00 PM , Thu 9:30 AM - 7:00 PM , Fri 9:00 AM - 5:00 PM  Fees: Free   Phone: (880) 438-5178 Email: contactus@[employer] Website: http://www.[employer]     2  [facility] Distance: 4.57 miles      In-Person   [address] [place] [address]  Language: English  Hours: Mon - Fri 8:00 AM - 3:00 PM  Fees: Free   Phone: (310) 690-6391 Ext.14 Email: neighborhood@[facility].[employer] Website: http://www.[facility].[employer]     Transportation to medical appointments  3  [facility] Medical Transportation - Non-Emergency Medical Transportation Distance: 3.33 miles      In-Person   [address] [place] [address]  Language: English  Hours: Mon - Fri 8:00 AM - 4:00 PM Appt. Only  Fees: Self Pay   Phone: (979) 227-1313 Website: http://www.[facility].org/Medical-Services/Emergency-medical-services/Non-emergency-transportation/     4  [facility] Transportation Programs - Non-Emergency Medical Transportation Distance: 3.38 miles      In-Person   [address] [facility] [address] [place] [address]  Language: English, German, Dominican  Hours: Mon - Fri 7:00 AM - 6:00 PM  Fees: Insurance   Phone: (756) 448-4766 Ext.4494 Email: [email or web address] Website: http://www.[email or web address]/[place]/          Important Numbers & Websites       Emergency Services   911  City Services   311  Poison Control   (125) 646-5123  Suicide Prevention Lifeline   (789) 722-2401 (TALK)  Child  Abuse Hotline   (864) 184-5495 (4-A-Child)  Sexual Assault Hotline   (535) 547-1204 (HOPE)  National Runaway Safeline   (822) 204-6365 (RUNAWAY)  All-Options Talkline   (889) 600-7173  Substance Abuse Referral   (476) 805-8789 (HELP)

## 2024-01-03 NOTE — PROGRESS NOTES
"SUBJECTIVE:   Vidal is a 23 year old, presenting for the following:  Concerns  (Blood Sugars and Weight) and Physical        1/3/2024     3:39 PM   Additional Questions   Roomed by Oksana CASSIDY CMA       Healthy Habits:     Taking medications regularly:  0  History of Present Illness       Reason for visit:  Bloodwork and general physical    He eats 0-1 servings of fruits and vegetables daily.He consumes 2 sweetened beverage(s) daily.He exercises with enough effort to increase his heart rate 9 or less minutes per day.  He exercises with enough effort to increase his heart rate 3 or less days per week.   He is taking medications regularly.      Today's PHQ-9 Score:       1/3/2024     3:23 PM   PHQ-9 SCORE   PHQ-9 Total Score MyChart 10 (Moderate depression)   PHQ-9 Total Score 10         Social History     Tobacco Use    Smoking status: Never    Smokeless tobacco: Never    Tobacco comments:     nonsmoking household   Substance Use Topics    Alcohol use: No              No data to display                Last PSA: No results found for: \"PSA\"    Reviewed orders with patient. Reviewed health maintenance and updated orders accordingly - Yes      Reviewed and updated as needed this visit by clinical staff   Tobacco  Allergies  Meds              Reviewed and updated as needed this visit by Provider                     Review of Systems  CONSTITUTIONAL: NEGATIVE for fever, chills, change in weight  INTEGUMENTARY/SKIN: NEGATIVE for worrisome rashes, moles or lesions  EYES: NEGATIVE for vision changes or irritation  ENT: NEGATIVE for ear, mouth and throat problems  RESP: NEGATIVE for significant cough or SOB  CV: NEGATIVE for chest pain, palpitations or peripheral edema  GI: NEGATIVE for nausea, abdominal pain, heartburn, or change in bowel habits   male: negative for dysuria, hematuria, decreased urinary stream, erectile dysfunction, urethral discharge  MUSCULOSKELETAL: NEGATIVE for significant arthralgias or " "myalgia  NEURO: NEGATIVE for weakness, dizziness or paresthesias  PSYCHIATRIC: NEGATIVE for changes in mood or affect    OBJECTIVE:   /85 (BP Location: Right arm, Patient Position: Chair, Cuff Size: Adult Large)   Pulse 105   Temp 99.4  F (37.4  C) (Oral)   Resp 18   Ht 1.835 m (6' 0.25\")   Wt 123.8 kg (273 lb)   SpO2 97%   BMI 36.77 kg/m      Physical Exam  GENERAL: healthy, alert and no distress  NECK: no adenopathy, no asymmetry, masses, or scars and thyroid normal to palpation  RESP: lungs clear to auscultation - no rales, rhonchi or wheezes  CV: regular rate and rhythm, normal S1 S2, no S3 or S4, no murmur, click or rub, no peripheral edema and peripheral pulses strong  ABDOMEN: soft, nontender, no hepatosplenomegaly, no masses and bowel sounds normal  MS: no gross musculoskeletal defects noted, no edema    Diagnostic Test Results:  Labs reviewed in Epic    ASSESSMENT/PLAN:   Vidal was seen today for physical.    Diagnoses and all orders for this visit:    Routine general medical examination at a health care facility  -     Glucose; Future    Screening for lipid disorders  -     Lipid panel reflex to direct LDL Fasting; Future    BMI 36.0-36.9,adult    Other orders  -     HPV9 (GARDASIL 9)  -     INFLUENZA VACCINE IM > 6 MONTHS VALENT IIV4 (AFLURIA/FLUZONE)  -     COVID-19 12+ (2023-24) (PFIZER)  -     PRIMARY CARE FOLLOW-UP SCHEDULING; Future        Patient has been advised of split billing requirements and indicates understanding: Yes      COUNSELING:   Reviewed preventive health counseling, as reflected in patient instructions      BMI:   Estimated body mass index is 36.77 kg/m  as calculated from the following:    Height as of this encounter: 1.835 m (6' 0.25\").    Weight as of this encounter: 123.8 kg (273 lb).         He reports that he has never smoked. He has never used smokeless tobacco.      JOE NICOLE DO  [facility]  "

## 2024-01-03 NOTE — COMMUNITY RESOURCES LIST (ENGLISH)
01/03/2024   [facility]  N/A  For questions about this resource list or additional care needs, please contact your primary care clinic or care manager.  Phone: [phone]   Email: N/A   Address: [address] [place] [id number]   Hours: N/A        Transportation       Free or low-cost transportation  1  [facility] Distance: 2.78 miles      In-Person   [address] [place] [address]  Language: English, Fijian  Hours: Mon 9:00 AM - 5:00 PM , Tue 9:30 AM - 7:00 PM , Wed 9:00 AM - 5:00 PM , Thu 9:30 AM - 7:00 PM , Fri 9:00 AM - 5:00 PM  Fees: Free   Phone: (682) [phone] Email: [email or web address] Website: http://www.[employer]     2  [facility] Distance: 4.57 miles      In-Person   [address] [place] [address]  Language: English  Hours: Mon - Fri 8:00 AM - 3:00 PM  Fees: Free   Phone: (592) 794-3839 Ext.14 Email: neighborhood@[email or web address].[employer] Website: http://www.[facility].[employer]     Transportation to medical appointments  3  [facility] Medical Transportation - Non-Emergency Medical Transportation Distance: 3.33 miles      In-Person   [address] [place] [address]  Language: English  Hours: Mon - Fri 8:00 AM - 4:00 PM Appt. Only  Fees: Self Pay   Phone: (751) 368-6858 Website: http://www.[facility].org/Medical-Services/Emergency-medical-services/Non-emergency-transportation/     4  [facility] Transportation Programs - Non-Emergency Medical Transportation Distance: 3.38 miles      In-Person   [address] [facility] [address] [place] [address]  Language: English, [place] Islander, Fijian  Hours: Mon - Fri 7:00 AM - 6:00 PM  Fees: Insurance   Phone: (510) [phone] Ext.[phone] Email: [email or web address] Website: [email or web address]          Important Numbers & Websites       Emergency Services   911  City Services   311  Poison Control   (590) 999-6498  Suicide Prevention Lifeline   (580) 979-7535 (TALK)  Child  Abuse Hotline   (176) 357-6141 (4-A-Child)  Sexual Assault Hotline   (219) 310-9706 (HOPE)  National Runaway Safeline   (265) 422-2508 (RUNAWAY)  All-Options Talkline   (622) 916-9526  Substance Abuse Referral   (996) 162-9868 (HELP)

## 2024-01-03 NOTE — PATIENT INSTRUCTIONS
Preventive Health Recommendations  Male Ages 21 - 25     Yearly exam:             See your health care provider every year in order to  o   Review health changes.   o   Discuss preventive care.    o   Review your medicines if your doctor has prescribed any.  You should be tested each year for STDs (sexually transmitted diseases).   Talk to your provider about cholesterol testing.    If you are at risk for diabetes, you should have a diabetes test (fasting glucose).    Shots: Get a flu shot each year. Get a tetanus shot every 10 years.     Nutrition:  Eat at least 5 servings of fruits and vegetables daily.   Eat whole-grain bread, whole-wheat pasta and brown rice instead of white grains and rice.   Get adequate calcium and Vitamin D.     Lifestyle  Exercise for at least 150 minutes a week (30 minutes a day, 5 days a week). This will help you control your weight and prevent disease.   Limit alcohol to one drink per day.   No smoking.   Wear sunscreen to prevent skin cancer.   See your dentist every six months for an exam and cleaning.      Attending Attestation (For Attendings USE Only)...

## 2024-01-03 NOTE — PROGRESS NOTES
"  {PROVIDER CHARTING PREFERENCE:293594}    Rip Drake is a 23 year old, presenting for the following health issues:  Concerns  (Blood Sugars and Weight)        1/3/2024     3:39 PM   Additional Questions   Roomed by Oksana CASSIDY CMA       History of Present Illness       Reason for visit:  Bloodwork and general physical    He eats 0-1 servings of fruits and vegetables daily.He consumes 2 sweetened beverage(s) daily.He exercises with enough effort to increase his heart rate 9 or less minutes per day.  He exercises with enough effort to increase his heart rate 3 or less days per week.   He is taking medications regularly.       {MA/LPN/RN Pre-Provider Visit Orders- hCG/UA/Strep (Optional):948152}  {SUPERLIST (Optional):422635}  {additonal problems for provider to add (Optional):614344}      Review of Systems   {ROS COMP (Optional):205401}      Objective    /85 (BP Location: Right arm, Patient Position: Chair, Cuff Size: Adult Large)   Pulse 105   Temp 99.4  F (37.4  C) (Oral)   Resp 18   Ht 1.835 m (6' 0.25\")   Wt 123.8 kg (273 lb)   SpO2 97%   BMI 36.77 kg/m    Body mass index is 36.77 kg/m .  Physical Exam   {Exam List (Optional):843980}    {Diagnostic Test Results (Optional):318893}    {AMBULATORY ATTESTATION (Optional):958780}              "

## 2024-01-03 NOTE — COMMUNITY RESOURCES LIST (ENGLISH)
01/03/2024   [facility]  N/A  For additional resource needs, please contact your health insurance member services or your primary care team.  Phone: [phone]   Email: N/A   Address: [address] [place] [address]   Hours: N/A        Transportation       Free or low-cost transportation  1  [facility] Distance: 2.78 miles      In-Person   [address] [place] [address]  Language: English, British  Hours: Mon 9:00 AM - 5:00 PM , Tue 9:30 AM - 7:00 PM , Wed 9:00 AM - 5:00 PM , Thu 9:30 AM - 7:00 PM , Fri 9:00 AM - 5:00 PM  Fees: Free   Phone: (624) 567-2738 Email: [email or web address] Website: http://www.[email or web address]     2  [facility] Distance: 4.57 miles      In-Person   [address] [place] [address]  Language: English  Hours: Mon - Fri 8:00 AM - 3:00 PM  Fees: Free   Phone: (750) [phone] Ext.14 Email: [email or web address] Website: http://www.[facility].[employer]     Transportation to medical appointments  3  [facility] Medical Transportation - Non-Emergency Medical Transportation Distance: 3.33 miles      In-Person   [address] [place] [address]  Language: English  Hours: Mon - Fri 8:00 AM - 4:00 PM Appt. Only  Fees: Self Pay   Phone: (235) [phone] Website: http://www.[facility].org/Medical-Services/Emergency-medical-services/Non-emergency-transportation/     4  [facility] Transportation Programs - Non-Emergency Medical Transportation Distance: 3.38 miles      In-Person   [address] [facility] [address] [place] [address]  Language: English, [place] Islander, British  Hours: Mon - Fri 7:00 AM - 6:00 PM  Fees: Insurance   Phone: (698) [phone] Ext.[phone] Email: [email or web address] Website: http://[email or web address]          Important Numbers & Websites       [address]  Poison Control   (407) 334-2901 [place]po[place].org  Suicide and Crisis Lifeline   98 988[facility].org  [facility] Clinicp  National Child Abuse Hotline   911.341.7202 Childhelphotline.org  National Sexual Assault Hotline   (719) 886-1618 (HOPE) Rainn.org  National Runaway Safeline   (282) 521-1392 (RUNAWAY) [facility].org  Pregnancy & Postpartum Support [place]   Call/text 973-153-0171 Ppsupport[place].org  Substance Abuse National Helpline ([facility]   390-362-HELP (7588) Findtreatment.gov  Emergency Services   915

## 2024-01-04 LAB
CHOLEST SERPL-MCNC: 164 MG/DL
FASTING STATUS PATIENT QL REPORTED: NO
FASTING STATUS PATIENT QL REPORTED: NO
GLUCOSE SERPL-MCNC: 84 MG/DL (ref 70–99)
HDLC SERPL-MCNC: 40 MG/DL
LDLC SERPL CALC-MCNC: 86 MG/DL
NONHDLC SERPL-MCNC: 124 MG/DL
TRIGL SERPL-MCNC: 190 MG/DL

## 2024-05-07 ENCOUNTER — HOSPITAL ENCOUNTER (EMERGENCY)
Facility: CLINIC | Age: 24
Discharge: LEFT BEFORE DISCHARGE | End: 2024-05-07
Admitting: EMERGENCY MEDICINE
Payer: COMMERCIAL

## 2024-05-07 VITALS
HEIGHT: 73 IN | WEIGHT: 263 LBS | BODY MASS INDEX: 34.85 KG/M2 | HEART RATE: 101 BPM | DIASTOLIC BLOOD PRESSURE: 82 MMHG | SYSTOLIC BLOOD PRESSURE: 150 MMHG | RESPIRATION RATE: 24 BRPM | OXYGEN SATURATION: 99 % | TEMPERATURE: 97.8 F

## 2024-05-07 LAB
ALBUMIN UR-MCNC: NEGATIVE MG/DL
APPEARANCE UR: CLEAR
BILIRUB UR QL STRIP: NEGATIVE
COLOR UR AUTO: NORMAL
GLUCOSE UR STRIP-MCNC: NEGATIVE MG/DL
HGB UR QL STRIP: NEGATIVE
KETONES UR STRIP-MCNC: NEGATIVE MG/DL
LEUKOCYTE ESTERASE UR QL STRIP: NEGATIVE
NITRATE UR QL: NEGATIVE
PH UR STRIP: 6 [PH] (ref 5–7)
RBC URINE: 1 /HPF
SP GR UR STRIP: 1.01 (ref 1–1.03)
UROBILINOGEN UR STRIP-MCNC: NORMAL MG/DL
WBC URINE: <1 /HPF

## 2024-05-07 PROCEDURE — 81001 URINALYSIS AUTO W/SCOPE: CPT | Performed by: EMERGENCY MEDICINE

## 2024-05-07 PROCEDURE — 99281 EMR DPT VST MAYX REQ PHY/QHP: CPT

## 2024-05-07 ASSESSMENT — ACTIVITIES OF DAILY LIVING (ADL): ADLS_ACUITY_SCORE: 33

## 2024-05-07 NOTE — ED TRIAGE NOTES
Patient arrives worried about urination issues, worried about his kidneys and the color of his urine. States his urine is dark orange, yellow. Doesn't like he is peeing enough. No bladder pain. No pain with urination, no burning with urination. No discharge. On Sunday drank 1 liter, 3 liters on Monday. Denies excessive working out, denies drugs.     Per mom has been sweating more.      Triage Assessment (Adult)       Row Name 05/07/24 0014          Triage Assessment    Airway WDL WDL        Respiratory WDL    Respiratory WDL WDL        Skin Circulation/Temperature WDL    Skin Circulation/Temperature WDL WDL        Cardiac WDL    Cardiac WDL WDL        Peripheral/Neurovascular WDL    Peripheral Neurovascular WDL WDL        Cognitive/Neuro/Behavioral WDL    Cognitive/Neuro/Behavioral WDL WDL

## 2024-05-23 ENCOUNTER — OFFICE VISIT (OUTPATIENT)
Dept: FAMILY MEDICINE | Facility: CLINIC | Age: 24
End: 2024-05-23
Payer: COMMERCIAL

## 2024-05-23 VITALS
WEIGHT: 262.9 LBS | HEART RATE: 78 BPM | DIASTOLIC BLOOD PRESSURE: 65 MMHG | TEMPERATURE: 97.8 F | RESPIRATION RATE: 24 BRPM | HEIGHT: 73 IN | BODY MASS INDEX: 34.84 KG/M2 | OXYGEN SATURATION: 97 % | SYSTOLIC BLOOD PRESSURE: 116 MMHG

## 2024-05-23 DIAGNOSIS — H61.23 BILATERAL IMPACTED CERUMEN: Primary | ICD-10-CM

## 2024-05-23 DIAGNOSIS — E66.811 CLASS 1 OBESITY DUE TO EXCESS CALORIES WITHOUT SERIOUS COMORBIDITY WITH BODY MASS INDEX (BMI) OF 34.0 TO 34.9 IN ADULT: ICD-10-CM

## 2024-05-23 DIAGNOSIS — R17 TOTAL BILIRUBIN, ELEVATED: ICD-10-CM

## 2024-05-23 DIAGNOSIS — E66.09 CLASS 1 OBESITY DUE TO EXCESS CALORIES WITHOUT SERIOUS COMORBIDITY WITH BODY MASS INDEX (BMI) OF 34.0 TO 34.9 IN ADULT: ICD-10-CM

## 2024-05-23 PROCEDURE — 36415 COLL VENOUS BLD VENIPUNCTURE: CPT | Performed by: GENERAL PRACTICE

## 2024-05-23 PROCEDURE — 99213 OFFICE O/P EST LOW 20 MIN: CPT | Performed by: GENERAL PRACTICE

## 2024-05-23 PROCEDURE — 80053 COMPREHEN METABOLIC PANEL: CPT | Performed by: GENERAL PRACTICE

## 2024-05-23 ASSESSMENT — PATIENT HEALTH QUESTIONNAIRE - PHQ9
SUM OF ALL RESPONSES TO PHQ QUESTIONS 1-9: 8
SUM OF ALL RESPONSES TO PHQ QUESTIONS 1-9: 8
10. IF YOU CHECKED OFF ANY PROBLEMS, HOW DIFFICULT HAVE THESE PROBLEMS MADE IT FOR YOU TO DO YOUR WORK, TAKE CARE OF THINGS AT HOME, OR GET ALONG WITH OTHER PEOPLE: SOMEWHAT DIFFICULT

## 2024-05-23 ASSESSMENT — PAIN SCALES - GENERAL: PAINLEVEL: NO PAIN (0)

## 2024-05-23 NOTE — PROGRESS NOTES
"  Assessment & Plan     Bilateral impacted cerumen  Right earwax impaction  Left earwax impaction, less  Patient does not want lavage in the clinic today and wants to see ENT for suction.   - Adult ENT  Referral; Future    Class 1 obesity due to excess calories without serious comorbidity with body mass index (BMI) of 34.0 to 34.9 in adult  No symptoms   Wants to check kidney and liver   - Comprehensive metabolic panel (BMP + Alb, Alk Phos, ALT, AST, Total. Bili, TP); Future          BMI  Estimated body mass index is 34.69 kg/m  as calculated from the following:    Height as of this encounter: 1.854 m (6' 1\").    Weight as of this encounter: 119.3 kg (262 lb 14.4 oz).             Rip Drake is a 23 year old, presenting for the following health issues:  Ear Problem (Left ear Ringing  ) and blood work        5/23/2024     2:44 PM   Additional Questions   Roomed by Crispin HOSKINS MA   Accompanied by self         5/23/2024     2:44 PM   Patient Reported Additional Medications   Patient reports taking the following new medications none       Wants to check kidney function. No concerns today about his kidneys    Ringing in the left ear for the past year  Used q-tips in the past    History of Present Illness       Reason for visit:  Multiple symptoms    He eats 0-1 servings of fruits and vegetables daily.He consumes 1 sweetened beverage(s) daily.He exercises with enough effort to increase his heart rate 20 to 29 minutes per day.  He exercises with enough effort to increase his heart rate 5 days per week.   He is taking medications regularly.     Left ear ringing possible ear wax, pt would like an ear wash and blood work done               Review of Systems  Constitutional, HEENT, cardiovascular, pulmonary, gi and gu systems are negative, except as otherwise noted.      Objective    /65 (BP Location: Right arm, Patient Position: Sitting, Cuff Size: Adult Large)   Pulse 78   Temp 97.8  F (36.6  C) (Oral)   " "Resp 24   Ht 1.854 m (6' 1\")   Wt 119.3 kg (262 lb 14.4 oz)   SpO2 97%   BMI 34.69 kg/m    Body mass index is 34.69 kg/m .  Physical Exam  Constitutional:       Appearance: Normal appearance.   HENT:      Ears:      Comments: Complete earwax impaction on R  Partial earwax on the left ear  Eyes:      Extraocular Movements: Extraocular movements intact.   Cardiovascular:      Rate and Rhythm: Normal rate and regular rhythm.   Pulmonary:      Effort: Pulmonary effort is normal.      Breath sounds: Normal breath sounds.   Abdominal:      Palpations: Abdomen is soft.   Musculoskeletal:         General: Normal range of motion.      Cervical back: Normal range of motion.   Skin:     General: Skin is warm.   Neurological:      General: No focal deficit present.      Mental Status: He is alert and oriented to person, place, and time. Mental status is at baseline.   Psychiatric:         Mood and Affect: Mood normal.         Behavior: Behavior normal.         Thought Content: Thought content normal.         Judgment: Judgment normal.                    Signed Electronically by: Delilah Patel MD    "

## 2024-05-24 LAB
ALBUMIN SERPL BCG-MCNC: 4.9 G/DL (ref 3.5–5.2)
ALP SERPL-CCNC: 97 U/L (ref 40–150)
ALT SERPL W P-5'-P-CCNC: 102 U/L (ref 0–70)
ANION GAP SERPL CALCULATED.3IONS-SCNC: 11 MMOL/L (ref 7–15)
AST SERPL W P-5'-P-CCNC: 45 U/L (ref 0–45)
BILIRUB SERPL-MCNC: 1.5 MG/DL
BUN SERPL-MCNC: 14.4 MG/DL (ref 6–20)
CALCIUM SERPL-MCNC: 9.6 MG/DL (ref 8.6–10)
CHLORIDE SERPL-SCNC: 103 MMOL/L (ref 98–107)
CREAT SERPL-MCNC: 0.85 MG/DL (ref 0.67–1.17)
DEPRECATED HCO3 PLAS-SCNC: 24 MMOL/L (ref 22–29)
EGFRCR SERPLBLD CKD-EPI 2021: >90 ML/MIN/1.73M2
GLUCOSE SERPL-MCNC: 86 MG/DL (ref 70–99)
POTASSIUM SERPL-SCNC: 4.4 MMOL/L (ref 3.4–5.3)
PROT SERPL-MCNC: 7.3 G/DL (ref 6.4–8.3)
SODIUM SERPL-SCNC: 138 MMOL/L (ref 135–145)

## 2024-08-27 ENCOUNTER — LAB (OUTPATIENT)
Dept: LAB | Facility: CLINIC | Age: 24
End: 2024-08-27
Payer: COMMERCIAL

## 2024-08-27 DIAGNOSIS — R17 TOTAL BILIRUBIN, ELEVATED: ICD-10-CM

## 2024-08-27 LAB
ALBUMIN SERPL BCG-MCNC: 4.6 G/DL (ref 3.5–5.2)
ALP SERPL-CCNC: 94 U/L (ref 40–150)
ALT SERPL W P-5'-P-CCNC: 73 U/L (ref 0–70)
AST SERPL W P-5'-P-CCNC: 35 U/L (ref 0–45)
BILIRUB DIRECT SERPL-MCNC: 0.23 MG/DL (ref 0–0.3)
BILIRUB SERPL-MCNC: 1.3 MG/DL
PROT SERPL-MCNC: 7.5 G/DL (ref 6.4–8.3)

## 2024-08-27 PROCEDURE — 80076 HEPATIC FUNCTION PANEL: CPT

## 2024-08-27 PROCEDURE — 36415 COLL VENOUS BLD VENIPUNCTURE: CPT

## 2025-03-02 ENCOUNTER — HEALTH MAINTENANCE LETTER (OUTPATIENT)
Age: 25
End: 2025-03-02

## 2025-06-10 ENCOUNTER — TELEPHONE (OUTPATIENT)
Dept: FAMILY MEDICINE | Facility: CLINIC | Age: 25
End: 2025-06-10
Payer: COMMERCIAL

## 2025-06-10 NOTE — TELEPHONE ENCOUNTER
Patient Quality Outreach    Patient is due for the following:   Depression  -  PHQ-9 needed, Depression follow-up visit, and DAP  Physical Preventive Adult Physical      Topic Date Due    Meningitis B Vaccine (1 of 2 - Standard) Never done    HPV Vaccine (3 - Male 3-dose series) 03/27/2024    COVID-19 Vaccine (4 - 2024-25 season) 09/01/2024       Action(s) Taken:   Schedule a Adult Preventative    Type of outreach:    Sent [employer] message.    Questions for provider review:    None         Oksana Birch CMA  Chart routed to Care Team.